# Patient Record
Sex: MALE | Race: WHITE | NOT HISPANIC OR LATINO | Employment: UNEMPLOYED | URBAN - METROPOLITAN AREA
[De-identification: names, ages, dates, MRNs, and addresses within clinical notes are randomized per-mention and may not be internally consistent; named-entity substitution may affect disease eponyms.]

---

## 2024-04-18 ENCOUNTER — HOSPITAL ENCOUNTER (INPATIENT)
Facility: HOSPITAL | Age: 64
LOS: 2 days | Discharge: HOME | DRG: 390 | End: 2024-04-20
Attending: EMERGENCY MEDICINE | Admitting: HOSPITALIST
Payer: COMMERCIAL

## 2024-04-18 DIAGNOSIS — E66.09 CLASS 1 OBESITY DUE TO EXCESS CALORIES WITH SERIOUS COMORBIDITY AND BODY MASS INDEX (BMI) OF 32.0 TO 32.9 IN ADULT: ICD-10-CM

## 2024-04-18 DIAGNOSIS — R07.9 CHEST PAIN: ICD-10-CM

## 2024-04-18 DIAGNOSIS — Z01.89 ENCOUNTER FOR IMAGING STUDY TO CONFIRM NASOGASTRIC (NG) TUBE PLACEMENT: ICD-10-CM

## 2024-04-18 DIAGNOSIS — I10 HYPERTENSION, UNSPECIFIED TYPE: ICD-10-CM

## 2024-04-18 DIAGNOSIS — K56.7 ILEUS: Primary | ICD-10-CM

## 2024-04-18 DIAGNOSIS — E83.39 HYPOPHOSPHATEMIA: ICD-10-CM

## 2024-04-18 PROBLEM — I25.10 CORONARY ARTERY DISEASE: Status: ACTIVE | Noted: 2024-04-18

## 2024-04-18 PROBLEM — K56.609 SBO (SMALL BOWEL OBSTRUCTION): Status: ACTIVE | Noted: 2024-04-18

## 2024-04-18 PROBLEM — E78.5 HYPERLIPEMIA: Status: ACTIVE | Noted: 2023-05-26

## 2024-04-18 LAB
ALBUMIN SERPL BCP-MCNC: 3.1 G/DL (ref 3.5–5.2)
ALLENS TEST: ABNORMAL
ALP SERPL-CCNC: 61 U/L (ref 55–135)
ALT SERPL W/O P-5'-P-CCNC: 32 U/L (ref 10–44)
ANION GAP SERPL CALC-SCNC: 16 MMOL/L (ref 8–16)
ANION GAP SERPL CALC-SCNC: 5 MMOL/L (ref 8–16)
AST SERPL-CCNC: 26 U/L (ref 10–40)
BASOPHILS # BLD AUTO: 0.01 K/UL (ref 0–0.2)
BASOPHILS NFR BLD: 0.2 % (ref 0–1.9)
BILIRUB SERPL-MCNC: 0.4 MG/DL (ref 0.1–1)
BUN SERPL-MCNC: 15 MG/DL (ref 6–30)
BUN SERPL-MCNC: 16 MG/DL (ref 8–23)
CALCIUM SERPL-MCNC: 8.7 MG/DL (ref 8.7–10.5)
CHLORIDE SERPL-SCNC: 103 MMOL/L (ref 95–110)
CHLORIDE SERPL-SCNC: 107 MMOL/L (ref 95–110)
CO2 SERPL-SCNC: 26 MMOL/L (ref 23–29)
CREAT SERPL-MCNC: 0.9 MG/DL (ref 0.5–1.4)
CREAT SERPL-MCNC: 0.9 MG/DL (ref 0.5–1.4)
DIFFERENTIAL METHOD BLD: ABNORMAL
EOSINOPHIL # BLD AUTO: 0 K/UL (ref 0–0.5)
EOSINOPHIL NFR BLD: 0.3 % (ref 0–8)
ERYTHROCYTE [DISTWIDTH] IN BLOOD BY AUTOMATED COUNT: 12.7 % (ref 11.5–14.5)
EST. GFR  (NO RACE VARIABLE): >60 ML/MIN/1.73 M^2
GLUCOSE SERPL-MCNC: 121 MG/DL (ref 70–110)
GLUCOSE SERPL-MCNC: 124 MG/DL (ref 70–110)
HCT VFR BLD AUTO: 39.1 % (ref 40–54)
HCT VFR BLD CALC: 39 %PCV (ref 36–54)
HGB BLD-MCNC: 13.2 G/DL (ref 14–18)
IMM GRANULOCYTES # BLD AUTO: 0.04 K/UL (ref 0–0.04)
IMM GRANULOCYTES NFR BLD AUTO: 0.6 % (ref 0–0.5)
LIPASE SERPL-CCNC: 23 U/L (ref 4–60)
LYMPHOCYTES # BLD AUTO: 0.5 K/UL (ref 1–4.8)
LYMPHOCYTES NFR BLD: 7.1 % (ref 18–48)
MCH RBC QN AUTO: 30.3 PG (ref 27–31)
MCHC RBC AUTO-ENTMCNC: 33.8 G/DL (ref 32–36)
MCV RBC AUTO: 90 FL (ref 82–98)
MONOCYTES # BLD AUTO: 0.6 K/UL (ref 0.3–1)
MONOCYTES NFR BLD: 8.6 % (ref 4–15)
NEUTROPHILS # BLD AUTO: 5.5 K/UL (ref 1.8–7.7)
NEUTROPHILS NFR BLD: 83.2 % (ref 38–73)
NRBC BLD-RTO: 0 /100 WBC
PLATELET # BLD AUTO: 196 K/UL (ref 150–450)
PMV BLD AUTO: 9.3 FL (ref 9.2–12.9)
POC IONIZED CALCIUM: 1.21 MMOL/L (ref 1.06–1.42)
POC TCO2 (MEASURED): 25 MMOL/L (ref 23–29)
POTASSIUM BLD-SCNC: 4 MMOL/L (ref 3.5–5.1)
POTASSIUM SERPL-SCNC: 4.1 MMOL/L (ref 3.5–5.1)
PROT SERPL-MCNC: 5.8 G/DL (ref 6–8.4)
RBC # BLD AUTO: 4.35 M/UL (ref 4.6–6.2)
SAMPLE: ABNORMAL
SITE: ABNORMAL
SODIUM BLD-SCNC: 139 MMOL/L (ref 136–145)
SODIUM SERPL-SCNC: 138 MMOL/L (ref 136–145)
WBC # BLD AUTO: 6.59 K/UL (ref 3.9–12.7)

## 2024-04-18 PROCEDURE — 83690 ASSAY OF LIPASE: CPT | Performed by: EMERGENCY MEDICINE

## 2024-04-18 PROCEDURE — 11000001 HC ACUTE MED/SURG PRIVATE ROOM

## 2024-04-18 PROCEDURE — 63600175 PHARM REV CODE 636 W HCPCS: Performed by: EMERGENCY MEDICINE

## 2024-04-18 PROCEDURE — 63600175 PHARM REV CODE 636 W HCPCS: Performed by: HOSPITALIST

## 2024-04-18 PROCEDURE — 99900035 HC TECH TIME PER 15 MIN (STAT)

## 2024-04-18 PROCEDURE — 99285 EMERGENCY DEPT VISIT HI MDM: CPT | Mod: 25

## 2024-04-18 PROCEDURE — 82330 ASSAY OF CALCIUM: CPT

## 2024-04-18 PROCEDURE — 84295 ASSAY OF SERUM SODIUM: CPT

## 2024-04-18 PROCEDURE — 96374 THER/PROPH/DIAG INJ IV PUSH: CPT

## 2024-04-18 PROCEDURE — 82565 ASSAY OF CREATININE: CPT

## 2024-04-18 PROCEDURE — 99222 1ST HOSP IP/OBS MODERATE 55: CPT | Mod: ,,, | Performed by: SURGERY

## 2024-04-18 PROCEDURE — 85014 HEMATOCRIT: CPT

## 2024-04-18 PROCEDURE — 96375 TX/PRO/DX INJ NEW DRUG ADDON: CPT

## 2024-04-18 PROCEDURE — 85025 COMPLETE CBC W/AUTO DIFF WBC: CPT | Performed by: EMERGENCY MEDICINE

## 2024-04-18 PROCEDURE — 80053 COMPREHEN METABOLIC PANEL: CPT | Performed by: EMERGENCY MEDICINE

## 2024-04-18 PROCEDURE — 84132 ASSAY OF SERUM POTASSIUM: CPT

## 2024-04-18 PROCEDURE — 0D9670Z DRAINAGE OF STOMACH WITH DRAINAGE DEVICE, VIA NATURAL OR ARTIFICIAL OPENING: ICD-10-PCS | Performed by: EMERGENCY MEDICINE

## 2024-04-18 RX ORDER — IBUPROFEN 200 MG
16 TABLET ORAL
Status: DISCONTINUED | OUTPATIENT
Start: 2024-04-18 | End: 2024-04-20 | Stop reason: HOSPADM

## 2024-04-18 RX ORDER — TALC
6 POWDER (GRAM) TOPICAL NIGHTLY PRN
Status: DISCONTINUED | OUTPATIENT
Start: 2024-04-18 | End: 2024-04-18

## 2024-04-18 RX ORDER — MORPHINE SULFATE 4 MG/ML
6 INJECTION, SOLUTION INTRAMUSCULAR; INTRAVENOUS
Status: COMPLETED | OUTPATIENT
Start: 2024-04-18 | End: 2024-04-18

## 2024-04-18 RX ORDER — CLOPIDOGREL BISULFATE 75 MG/1
75 TABLET ORAL DAILY
COMMUNITY
Start: 2024-01-05

## 2024-04-18 RX ORDER — NALOXONE HCL 0.4 MG/ML
0.02 VIAL (ML) INJECTION
Status: DISCONTINUED | OUTPATIENT
Start: 2024-04-18 | End: 2024-04-20 | Stop reason: HOSPADM

## 2024-04-18 RX ORDER — AMLODIPINE BESYLATE 2.5 MG/1
2.5 TABLET ORAL DAILY
COMMUNITY
Start: 2024-04-15

## 2024-04-18 RX ORDER — GLUCAGON 1 MG
1 KIT INJECTION
Status: DISCONTINUED | OUTPATIENT
Start: 2024-04-18 | End: 2024-04-20 | Stop reason: HOSPADM

## 2024-04-18 RX ORDER — IBUPROFEN 200 MG
24 TABLET ORAL
Status: DISCONTINUED | OUTPATIENT
Start: 2024-04-18 | End: 2024-04-20 | Stop reason: HOSPADM

## 2024-04-18 RX ORDER — ONDANSETRON HYDROCHLORIDE 2 MG/ML
4 INJECTION, SOLUTION INTRAVENOUS
Status: COMPLETED | OUTPATIENT
Start: 2024-04-18 | End: 2024-04-18

## 2024-04-18 RX ORDER — ONDANSETRON 4 MG/1
4 TABLET, ORALLY DISINTEGRATING ORAL EVERY 8 HOURS PRN
Status: DISCONTINUED | OUTPATIENT
Start: 2024-04-18 | End: 2024-04-20 | Stop reason: HOSPADM

## 2024-04-18 RX ORDER — SODIUM CHLORIDE 0.9 % (FLUSH) 0.9 %
10 SYRINGE (ML) INJECTION EVERY 8 HOURS
Status: DISCONTINUED | OUTPATIENT
Start: 2024-04-18 | End: 2024-04-18

## 2024-04-18 RX ORDER — DEXTROSE MONOHYDRATE AND SODIUM CHLORIDE 5; .9 G/100ML; G/100ML
INJECTION, SOLUTION INTRAVENOUS CONTINUOUS
Status: DISCONTINUED | OUTPATIENT
Start: 2024-04-18 | End: 2024-04-20 | Stop reason: HOSPADM

## 2024-04-18 RX ORDER — SODIUM CHLORIDE 0.9 % (FLUSH) 0.9 %
10 SYRINGE (ML) INJECTION
Status: DISCONTINUED | OUTPATIENT
Start: 2024-04-18 | End: 2024-04-20 | Stop reason: HOSPADM

## 2024-04-18 RX ADMIN — DEXTROSE AND SODIUM CHLORIDE: 5; 900 INJECTION, SOLUTION INTRAVENOUS at 07:04

## 2024-04-18 RX ADMIN — ONDANSETRON 4 MG: 2 INJECTION INTRAMUSCULAR; INTRAVENOUS at 03:04

## 2024-04-18 RX ADMIN — MORPHINE SULFATE 6 MG: 4 INJECTION, SOLUTION INTRAMUSCULAR; INTRAVENOUS at 03:04

## 2024-04-18 NOTE — ED PROVIDER NOTES
Emergency Department Provider Note    David Krishnan   63 y.o. male   [id number]      4/18/2024       History     This history was obtained from the patient without limitations.  I also reviewed the provider documentation and CT imaging report sent with the patient.    He is a 63-year-old with the below past medical history who presents by ambulance from [facility] where he presented with abdominal and was found to have partial small-bowel obstruction on CT abdomen and pelvis without contrast.  He complains intermittent pain across his upper abdomen that began yesterday evening.  He is associated nausea but no vomiting.  He has a history of Nissen fundoplication.  The abdominal CT also identified a moderate-sized hiatal hernia.  He denies fever, chills, headache, dizziness, chest pain, and shortness.  He has had a bowel movement since the onset of his symptoms and it was loose.  He is still having flatus.         Past Medical History:   Diagnosis Date    Coronary artery disease 04/18/2024    Hyperlipemia 05/26/2023      No past surgical history on file.   No family history on file.   Social History     Socioeconomic History    Marital status:       Review of patient's allergies indicates:  No Known Allergies        Physical Examination     Initial Vitals [04/18/24 1327]   BP Pulse Resp Temp SpO2   (!) 141/82 74 18 98.7 °F (37.1 °C) 96 %      MAP       --           Physical Exam    Nursing note and vitals reviewed.  Constitutional: He is not diaphoretic. No distress.   Eyes: Conjunctivae are normal. No scleral icterus.   Cardiovascular:  Normal rate, regular rhythm and normal heart sounds.     Exam reveals no gallop and no friction rub.       No murmur heard.  Pulmonary/Chest: No respiratory distress. He has no wheezes. He has no rhonchi.   Abdominal: Abdomen is soft. He exhibits no distension. There is abdominal tenderness in the right upper quadrant, epigastric area and left upper quadrant.  There is guarding.     Neurological: He is alert and oriented to person, place, and time. GCS score is 15. GCS eye subscore is 4. GCS verbal subscore is 5. GCS motor subscore is 6.   Skin: Skin is warm and dry. No pallor.            Labs     Labs Reviewed   CBC W/ AUTO DIFFERENTIAL - Abnormal; Notable for the following components:       Result Value    RBC 4.35 (*)     Hemoglobin 13.2 (*)     Hematocrit 39.1 (*)     Immature Granulocytes 0.6 (*)     Lymph # 0.5 (*)     Gran % 83.2 (*)     Lymph % 7.1 (*)     All other components within normal limits   COMPREHENSIVE METABOLIC PANEL - Abnormal; Notable for the following components:    Glucose 124 (*)     Total Protein 5.8 (*)     Albumin 3.1 (*)     Anion Gap 5 (*)     All other components within normal limits   ISTAT PROCEDURE - Abnormal; Notable for the following components:    POC Glucose 121 (*)     All other components within normal limits   LIPASE   ISTAT CHEM8        Imaging     Imaging Results              XR Gastric tube check, non-radiologist performed (Final result)  Result time 04/18/24 17:58:02   Procedure changed from X-Ray Abdomen AP 1 View (KUB)     Final result by Pascale Mcnair MD (04/18/24 17:58:02)                   Impression:      NG tube in place.      Electronically signed by: Pascale Mcnair MD  Date:    04/18/2024  Time:    17:58               Narrative:    EXAMINATION:  XR GASTRIC TUBE CHECK, NON-RADIOLOGIST PERFORMED    CLINICAL HISTORY:  NG Placement;  Encounter for other specified special examinations    TECHNIQUE:  Single AP view of the upper abdomen.    COMPARISON:  None    FINDINGS:  Enteric tube extends well below the diaphragm curled retrograde in the gastric fundal region.  There is gaseous distention of small bowel loops in the mid upper abdomen which could indicate ileus or developing obstruction.                                        ED Course     The patient received the following medications:  Medications   dextrose 5 % and  0.9 % NaCl infusion ( Intravenous New Bag 4/19/24 1413)   naloxone 0.4 mg/mL injection 0.02 mg (has no administration in time range)   glucose chewable tablet 16 g (has no administration in time range)   glucose chewable tablet 24 g (has no administration in time range)   glucagon (human recombinant) injection 1 mg (has no administration in time range)   sodium chloride 0.9% flush 10 mL (has no administration in time range)   ondansetron disintegrating tablet 4 mg (has no administration in time range)   acetaminophen tablet 650 mg (has no administration in time range)   morphine injection 6 mg (6 mg Intravenous Given 4/18/24 1533)   ondansetron injection 4 mg (4 mg Intravenous Given 4/18/24 1533)   sodium phosphate 15 mmol in dextrose 5 % (D5W) 250 mL IVPB (0 mmol Intravenous Stopped 4/19/24 1210)   ketorolac injection 15 mg (15 mg Intravenous Given 4/19/24 0659)   ketorolac injection 15 mg (15 mg Intravenous Given 4/19/24 1517)   diatrizoate meglumineand-diatrizoate sodium (GASTROVIEW) solution 120 mL (120 mLs Per NG tube Given 4/19/24 1000)       Procedures    ED Course as of 04/19/24 2134   Thu Apr 18, 2024   1542 WBC: 6.59 [LP]   1542 Hemoglobin(!): 13.2 [LP]   1542 Hematocrit(!): 39.1 [LP]   1542 Platelet Count: 196 [LP]   1542 Comp. Metabolic Panel(!)  Glucose 124, total protein 5.8, albumin 3.1.  All other results WNL. [LP]   1542 Lipase: 23 [LP]      ED Course User Index  [LP] Michael Miles III, MD        Medical Decision Making                 Medical Decision Making  Patient sent to the ED for further evaluation after partial SBO identified on CT.  Multifocal abdominal tenderness on exam.  General surgery consulted and felt SBO to be unlikely since the patient was having bowel movements and flatus.  They recommended NG tube placement for gastrointestinal decompression and admission to hospital medicine.     Problems Addressed:  Ileus: acute illness or injury    Amount and/or Complexity of Data  Reviewed  Labs: ordered. Decision-making details documented in ED Course.  Radiology: ordered.    Risk  OTC drugs.  Prescription drug management.  Decision regarding hospitalization.              Diagnoses       ICD-10-CM ICD-9-CM   1. Ileus  K56.7 560.1   2. Encounter for imaging study to confirm nasogastric (NG) tube placement  Z01.89 V72.5         Dispostion      ED Disposition Condition    Admit Stable             Michael Miles III, MD  04/19/24 5531

## 2024-04-18 NOTE — ASSESSMENT & PLAN NOTE
Followed in [place] for history of CAD. Home plavix held while NPO with NG tube in place given ileus.

## 2024-04-18 NOTE — H&P
[facility] Emergency Dept  [facility]  History & Physical    Patient Name: David Krishnan  MRN: [id number]  Patient Class: IP- Inpatient  Admission Date: 4/18/2024  Attending Physician: Coy Osborn, Sy   Primary Care Provider: Sandy, Primary Doctor         Patient information was obtained from patient, past medical records, and ER records.     Subjective:     Principal Problem:Ileus    Chief Complaint:   Chief Complaint   Patient presents with    Abdominal Pain     EMS called to 64yo male that was at [facility] for abdominal pain, diarrhea, and nausea that started last night. CT at [facility] confirmed a small bowel obstruction and was sent here for further evaluation. He was given GI cocktail, zofran, and LR at [facility].         HPI: David Krishnan 63 y.o. male with CAD, HTN, history of Nissen fundoplication presents to the hosptial with a chief complaint of abdominal pain.  He reports 24 hours of progressive abdominal pain with associated nausea.  Reports he has had bowel movements since the symptoms began in his bowel movement was this morning.  He initially was seen and at [facility] and underwent a CT scan of the was directed to the emergency room given abnormal findings.  He finds his nausea is improved with nasogastric tube placement.  Denies any passage of flatus today.  He denies fever chest pain shortness breast leg swelling hematuria hematemesis dizziness or syncope.  His Nissen fundoplication was 15 years ago.    In the ED, afebrile without leukocytosis seen by General surgery who reviewed outside CT scan suspect his symptoms related to ileus.    Past Medical History:   Diagnosis Date    Coronary artery disease 04/18/2024    Hyperlipemia 05/26/2023       No past surgical history on file.    Review of patient's allergies indicates:  No Known Allergies    Current Facility-Administered Medications   Medication Dose Route Frequency Provider Last Rate Last Admin    dextrose 5 % and 0.9 % NaCl  infusion   Intravenous Continuous Coy Osborn MD        glucagon (human recombinant) injection 1 mg  1 mg Intramuscular PRN Sai Mcelroy PA-C        glucose chewable tablet 16 g  16 g Oral PRN Sai Mcelroy PA-C        glucose chewable tablet 24 g  24 g Oral PRN Sai Mcelroy PA-C        naloxone 0.4 mg/mL injection 0.02 mg  0.02 mg Intravenous PRN ShowSai patel PA-C        ondansetron disintegrating tablet 4 mg  4 mg Oral Q8H PRN ShowSai patel PA-C        sodium chloride 0.9% flush 10 mL  10 mL Intravenous PRN ShowSai patel PA-C         Current Outpatient Medications   Medication Sig Dispense Refill    amLODIPine (NORVASC) 2.5 MG tablet Take 2.5 mg by mouth once daily.      clopidogreL (PLAVIX) 75 mg tablet Take 75 mg by mouth once daily.       Family History    None       Tobacco Use    Smoking status: Not on file    Smokeless tobacco: Not on file   Substance and Sexual Activity    Alcohol use: Not on file    Drug use: Not on file    Sexual activity: Not on file     Review of Systems   Constitutional:  Negative for chills and fever.   HENT:  Negative for nosebleeds and tinnitus.    Eyes:  Negative for photophobia and visual disturbance.   Respiratory:  Negative for shortness of breath and wheezing.    Cardiovascular:  Negative for chest pain, palpitations and leg swelling.   Gastrointestinal:  Positive for abdominal pain and nausea. Negative for abdominal distention and vomiting.   Genitourinary:  Negative for dysuria, flank pain and hematuria.   Musculoskeletal:  Negative for gait problem and joint swelling.   Skin:  Negative for rash and wound.   Neurological:  Negative for seizures and syncope.     Objective:     Vital Signs (Most Recent):  Temp: 98.7 °F (37.1 °C) (04/18/24 1327)  Pulse: 72 (04/18/24 1733)  Resp: (!) 22 (04/18/24 1733)  BP: (!) 142/79 (04/18/24 1733)  SpO2: 96 % (04/18/24 1733) Vital Signs (24h Range):  Temp:  [98.7 °F (37.1 °C)] 98.7 °F (37.1 °C)  Pulse:  [72-76]  72  Resp:  [18-22] 22  SpO2:  [91 %-96 %] 96 %  BP: (131-142)/(73-82) 142/79     Weight: 117.9 kg (260 lb)  Body mass index is 32.5 kg/m².     Physical Exam  Vitals and nursing note reviewed.   Constitutional:       General: He is not in acute distress.     Appearance: He is well-developed. He is not diaphoretic.   HENT:      Head: Normocephalic and atraumatic.      Right Ear: External ear normal.      Left Ear: External ear normal.      Nose:      Comments: NG tube in place  Eyes:      General:         Right eye: No discharge.         Left eye: No discharge.      Conjunctiva/sclera: Conjunctivae normal.   Neck:      Thyroid: No thyromegaly.   Cardiovascular:      Rate and Rhythm: Normal rate and regular rhythm.      Heart sounds: No murmur heard.  Pulmonary:      Effort: Pulmonary effort is normal. No respiratory distress.      Breath sounds: Normal breath sounds.   Abdominal:      General: Bowel sounds are normal. There is distension.      Palpations: Abdomen is soft. There is no mass.      Tenderness: There is abdominal tenderness (diffuse tenderness without rebound). There is no rebound.   Musculoskeletal:         General: No deformity.      Cervical back: Normal range of motion and neck supple.      Right lower leg: No edema.      Left lower leg: No edema.   Skin:     General: Skin is warm and dry.   Neurological:      Mental Status: He is alert and oriented to person, place, and time.      Sensory: No sensory deficit.   Psychiatric:         Mood and Affect: Mood normal.         Behavior: Behavior normal.                Significant Labs: CBC:   Recent Labs   Lab 04/18/24  1442 04/18/24  1443   WBC  --  6.59   HGB  --  13.2*   HCT 39 39.1*   PLT  --  196     CMP:   Recent Labs   Lab 04/18/24  1443      K 4.1      CO2 26   *   BUN 16   CREATININE 0.9   CALCIUM 8.7   PROT 5.8*   ALBUMIN 3.1*   BILITOT 0.4   ALKPHOS 61   AST 26   ALT 32   ANIONGAP 5*       Significant Imaging:   Imaging Results               X-Ray Abdomen AP 1 View (KUB) (In process)                     Assessment/Plan:     * Ileus  Presented with abdominal pain and nausea. CT scan from [facility] with findings concerning for possible SBO. He repotrs has had loose BM today.   CT scan reviewed by surgery and symptoms suspected related to ileus  NG tube to be placed, LIWS after confirmation  NPO/IVF  Daily CMP/Mg/Phos    Will treat conservatively with bowel rest, IV fluids, serial abdominal exams and avoidance of GI paralytics such as narcotics or anti-spasmodics. Monitor patient closely.       HTN (hypertension)  Chronic, controlled. Latest blood pressure and vitals reviewed-     Temp:  [98.7 °F (37.1 °C)]   Pulse:  [72-76]   Resp:  [18-22]   BP: (131-142)/(73-82)   SpO2:  [91 %-96 %] .   Home meds for hypertension were reviewed and noted below.   Hypertension Medications               amLODIPine (NORVASC) 2.5 MG tablet Take 2.5 mg by mouth once daily.            While in the hospital, will manage blood pressure as follows; Adjust home antihypertensive regimen as follows- home amlodipine held while NPO for ileus as above    Will utilize p.r.n. blood pressure medication only if patient's blood pressure greater than 180/110 and he develops symptoms such as worsening chest pain or shortness of breath.    Coronary artery disease  Followed in [place] for history of CAD. Home plavix held while NPO with NG tube in place given ileus.       VTE Risk Mitigation (From admission, onward)           Ordered     IP VTE HIGH RISK PATIENT  Once         04/18/24 1739     Place sequential compression device  Until discontinued         04/18/24 1739     Place BRITTNEY hose  Until discontinued         04/18/24 1739                     Discussed with ED physician.    VTE: brittney/scd  Code: Full  Diet: NPO  Dispo: pending tolerating PO and surgery recommendations  As clarification, on 4/18/2024, patient should be admitted to inpatient services under my care in collaboration with  Coy Osborn MD. Sai Mcelroy PA-C        AdmissionCare    Guideline: Intestinal Obstruction, Inpatient    Based on the indications selected for the patient, the bed status of Inpatient was determined to be MET    The following indications were selected as present at the time of evaluation of the patient:      - Signs and symptoms of bowel obstruction (eg, vomiting, inability to tolerate PO intake, pain, distention) that are severe (feculent vomiting, Hypotension, electrolyte abnormality, evidence of bowel ischemia or suspected perforation), or persistent (eg, NG tube placed and will need to be continued, IV hydration support required)   - Imaging study consistent with bowel obstruction (ie, alternative diagnosis not more likely)    AdmissionCare documentation entered by: Lizbeth Coombs    [facility], 27th edition, Copyright © 2023 [facility], [employer] All Rights Reserved.  [id number]:18:11-05:00    Sai Mcelroy PA-C  Department of Hospital Medicine  [facility] - Emergency Dept

## 2024-04-18 NOTE — HPI
aDvid Krishnan 63 y.o. male with CAD, HTN, history of Nissen fundoplication presents to the hosptial with a chief complaint of abdominal pain.  He reports 24 hours of progressive abdominal pain with associated nausea.  Reports he has had bowel movements since the symptoms began in his bowel movement was this morning.  He initially was seen and at [facility] and underwent a CT scan of the was directed to the emergency room given abnormal findings.  He finds his nausea is improved with nasogastric tube placement.  Denies any passage of flatus today.  He denies fever chest pain shortness breast leg swelling hematuria hematemesis dizziness or syncope.  His Nissen fundoplication was 15 years ago.    In the ED, afebrile without leukocytosis seen by General surgery who reviewed outside CT scan suspect his symptoms related to ileus.

## 2024-04-18 NOTE — ADMISSIONCARE
AdmissionCare    Guideline: Intestinal Obstruction, Inpatient    Based on the indications selected for the patient, the bed status of Inpatient was determined to be MET    The following indications were selected as present at the time of evaluation of the patient:      - Signs and symptoms of bowel obstruction (eg, vomiting, inability to tolerate PO intake, pain, distention) that are severe (feculent vomiting, Hypotension, electrolyte abnormality, evidence of bowel ischemia or suspected perforation), or persistent (eg, NG tube placed and will need to be continued, IV hydration support required)   - Imaging study consistent with bowel obstruction (ie, alternative diagnosis not more likely)    AdmissionCare documentation entered by: Lizbeth Coombs    [facility], 27th edition, Copyright © 2023 [facility] [place] [employer], [facility] All Rights Reserved.  [id number]:18:11-05:00 Continue dialysis MWF

## 2024-04-18 NOTE — SUBJECTIVE & OBJECTIVE
Past Medical History:   Diagnosis Date    Coronary artery disease 04/18/2024    Hyperlipemia 05/26/2023       No past surgical history on file.    Review of patient's allergies indicates:  No Known Allergies    Current Facility-Administered Medications   Medication Dose Route Frequency Provider Last Rate Last Admin    dextrose 5 % and 0.9 % NaCl infusion   Intravenous Continuous Coy Osborn MD        glucagon (human recombinant) injection 1 mg  1 mg Intramuscular PRN ShowSai patel PA-C        glucose chewable tablet 16 g  16 g Oral PRN ShowSai patel PA-C        glucose chewable tablet 24 g  24 g Oral PRN ShowSai patel PA-C        naloxone 0.4 mg/mL injection 0.02 mg  0.02 mg Intravenous PRN ShowSai patel PA-C        ondansetron disintegrating tablet 4 mg  4 mg Oral Q8H PRN ShowSai patel PA-C        sodium chloride 0.9% flush 10 mL  10 mL Intravenous PRN ShowSai patel PA-C         Current Outpatient Medications   Medication Sig Dispense Refill    amLODIPine (NORVASC) 2.5 MG tablet Take 2.5 mg by mouth once daily.      clopidogreL (PLAVIX) 75 mg tablet Take 75 mg by mouth once daily.       Family History    None       Tobacco Use    Smoking status: Not on file    Smokeless tobacco: Not on file   Substance and Sexual Activity    Alcohol use: Not on file    Drug use: Not on file    Sexual activity: Not on file     Review of Systems   Constitutional:  Negative for chills and fever.   HENT:  Negative for nosebleeds and tinnitus.    Eyes:  Negative for photophobia and visual disturbance.   Respiratory:  Negative for shortness of breath and wheezing.    Cardiovascular:  Negative for chest pain, palpitations and leg swelling.   Gastrointestinal:  Positive for abdominal pain and nausea. Negative for abdominal distention and vomiting.   Genitourinary:  Negative for dysuria, flank pain and hematuria.   Musculoskeletal:  Negative for gait problem and joint swelling.   Skin:  Negative for rash and  wound.   Neurological:  Negative for seizures and syncope.     Objective:     Vital Signs (Most Recent):  Temp: 98.7 °F (37.1 °C) (04/18/24 1327)  Pulse: 72 (04/18/24 1733)  Resp: (!) 22 (04/18/24 1733)  BP: (!) 142/79 (04/18/24 1733)  SpO2: 96 % (04/18/24 1733) Vital Signs (24h Range):  Temp:  [98.7 °F (37.1 °C)] 98.7 °F (37.1 °C)  Pulse:  [72-76] 72  Resp:  [18-22] 22  SpO2:  [91 %-96 %] 96 %  BP: (131-142)/(73-82) 142/79     Weight: 117.9 kg (260 lb)  Body mass index is 32.5 kg/m².     Physical Exam  Vitals and nursing note reviewed.   Constitutional:       General: He is not in acute distress.     Appearance: He is well-developed. He is not diaphoretic.   HENT:      Head: Normocephalic and atraumatic.      Right Ear: External ear normal.      Left Ear: External ear normal.      Nose:      Comments: NG tube in place  Eyes:      General:         Right eye: No discharge.         Left eye: No discharge.      Conjunctiva/sclera: Conjunctivae normal.   Neck:      Thyroid: No thyromegaly.   Cardiovascular:      Rate and Rhythm: Normal rate and regular rhythm.      Heart sounds: No murmur heard.  Pulmonary:      Effort: Pulmonary effort is normal. No respiratory distress.      Breath sounds: Normal breath sounds.   Abdominal:      General: Bowel sounds are normal. There is distension.      Palpations: Abdomen is soft. There is no mass.      Tenderness: There is abdominal tenderness (diffuse tenderness without rebound). There is no rebound.   Musculoskeletal:         General: No deformity.      Cervical back: Normal range of motion and neck supple.      Right lower leg: No edema.      Left lower leg: No edema.   Skin:     General: Skin is warm and dry.   Neurological:      Mental Status: He is alert and oriented to person, place, and time.      Sensory: No sensory deficit.   Psychiatric:         Mood and Affect: Mood normal.         Behavior: Behavior normal.                Significant Labs: CBC:   Recent Labs   Lab  04/18/24  1442 04/18/24  1443   WBC  --  6.59   HGB  --  13.2*   HCT 39 39.1*   PLT  --  196     CMP:   Recent Labs   Lab 04/18/24  1443      K 4.1      CO2 26   *   BUN 16   CREATININE 0.9   CALCIUM 8.7   PROT 5.8*   ALBUMIN 3.1*   BILITOT 0.4   ALKPHOS 61   AST 26   ALT 32   ANIONGAP 5*       Significant Imaging:   Imaging Results              X-Ray Abdomen AP 1 View (KUB) (In process)

## 2024-04-18 NOTE — ASSESSMENT & PLAN NOTE
Presented with abdominal pain and nausea. CT scan from [facility] with findings concerning for possible SBO. He repotrs has had loose BM today.   CT scan reviewed by surgery and symptoms suspected related to ileus  NG tube to be placed, EUOLGIO after confirmation  NPO/IVF  Daily CMP/Mg/Phos    Will treat conservatively with bowel rest, IV fluids, serial abdominal exams and avoidance of GI paralytics such as narcotics or anti-spasmodics. Monitor patient closely.

## 2024-04-18 NOTE — ASSESSMENT & PLAN NOTE
Chronic, controlled. Latest blood pressure and vitals reviewed-     Temp:  [98.7 °F (37.1 °C)]   Pulse:  [72-76]   Resp:  [18-22]   BP: (131-142)/(73-82)   SpO2:  [91 %-96 %] .   Home meds for hypertension were reviewed and noted below.   Hypertension Medications               amLODIPine (NORVASC) 2.5 MG tablet Take 2.5 mg by mouth once daily.            While in the hospital, will manage blood pressure as follows; Adjust home antihypertensive regimen as follows- home amlodipine held while NPO for ileus as above    Will utilize p.r.n. blood pressure medication only if patient's blood pressure greater than 180/110 and he develops symptoms such as worsening chest pain or shortness of breath.

## 2024-04-18 NOTE — CONSULTS
General Surgery    Reason for Consult: Concern for SBO    History of Present Illness:  63 y.o. male with CAD and prior Nissen Fundoplication presenting with 24h of progressive abdominal pain and nausea. He reports that the pain started after eating. He initially thought the pain was secondary to food poisoning. Since the onset of the pain, he had two loose Bms last night which were small and a large, loose BM this morning. He denies having pain like this before.    He is visiting from the [place] area.    Allergies: NKDA  PMHx: Above  PSHx: Nissen Fundoplication     ROS per HPI    Vital Signs (Most Recent)  Temp: 98.7 °F (37.1 °C) (04/18/24 1327)  Pulse: 76 (04/18/24 1613)  Resp: 18 (04/18/24 1613)  BP: 131/73 (04/18/24 1613)  SpO2: (!) 91 % (04/18/24 1613)    Physical Exam  Vitals reviewed.   Constitutional:       Appearance: Normal appearance. He is obese. He is not toxic-appearing.   HENT:      Head: Normocephalic.   Eyes:      Extraocular Movements: Extraocular movements intact.      Conjunctiva/sclera: Conjunctivae normal.   Cardiovascular:      Rate and Rhythm: Normal rate and regular rhythm.   Pulmonary:      Effort: Pulmonary effort is normal. No respiratory distress.   Abdominal:      Comments: Mildly distended but soft. Nontender, but just received narcotic pain meds   Musculoskeletal:         General: No signs of injury.      Cervical back: Normal range of motion and neck supple.      Right lower leg: No edema.      Left lower leg: No edema.   Skin:     General: Skin is warm and dry.   Neurological:      General: No focal deficit present.      Mental Status: He is alert and oriented to person, place, and time.           Labs- Reviewed    Imaging-   -Outside CT images personally reviewed. The stomach is moderately dilated. The small bowel is diffusely dilated without a convincing transition point.    Assessment and Plan:  63 y.o. male with CAD and prior Nissen Fundoplication presenting with 24h of  abdominal pain and nausea. Workup seems most consistent with an ileus. A mechanical obstruction is less likely given his continued bowel function throughout this time.    -Recommend NG placement for symptomatic relief  -IVF  -NPO  -Serial exams    Chace Garcia MD  General Surgery PGY-4  04/18/2024

## 2024-04-19 PROBLEM — E83.39 HYPOPHOSPHATEMIA: Status: ACTIVE | Noted: 2024-04-19

## 2024-04-19 PROBLEM — E66.09 CLASS 1 OBESITY DUE TO EXCESS CALORIES WITH SERIOUS COMORBIDITY AND BODY MASS INDEX (BMI) OF 32.0 TO 32.9 IN ADULT: Status: ACTIVE | Noted: 2024-04-19

## 2024-04-19 LAB
ALBUMIN SERPL BCP-MCNC: 2.7 G/DL (ref 3.5–5.2)
ALP SERPL-CCNC: 51 U/L (ref 55–135)
ALT SERPL W/O P-5'-P-CCNC: 31 U/L (ref 10–44)
ANION GAP SERPL CALC-SCNC: 7 MMOL/L (ref 8–16)
AST SERPL-CCNC: 25 U/L (ref 10–40)
BASOPHILS # BLD AUTO: 0.01 K/UL (ref 0–0.2)
BASOPHILS NFR BLD: 0.3 % (ref 0–1.9)
BILIRUB SERPL-MCNC: 0.4 MG/DL (ref 0.1–1)
BILIRUB UR QL STRIP: NEGATIVE
BUN SERPL-MCNC: 17 MG/DL (ref 8–23)
CALCIUM SERPL-MCNC: 8 MG/DL (ref 8.7–10.5)
CHLORIDE SERPL-SCNC: 108 MMOL/L (ref 95–110)
CLARITY UR: CLEAR
CO2 SERPL-SCNC: 24 MMOL/L (ref 23–29)
COLOR UR: YELLOW
CREAT SERPL-MCNC: 0.9 MG/DL (ref 0.5–1.4)
DIFFERENTIAL METHOD BLD: ABNORMAL
EOSINOPHIL # BLD AUTO: 0.1 K/UL (ref 0–0.5)
EOSINOPHIL NFR BLD: 2.4 % (ref 0–8)
ERYTHROCYTE [DISTWIDTH] IN BLOOD BY AUTOMATED COUNT: 12.9 % (ref 11.5–14.5)
EST. GFR  (NO RACE VARIABLE): >60 ML/MIN/1.73 M^2
GLUCOSE SERPL-MCNC: 117 MG/DL (ref 70–110)
GLUCOSE UR QL STRIP: NEGATIVE
HCT VFR BLD AUTO: 38.1 % (ref 40–54)
HGB BLD-MCNC: 12.1 G/DL (ref 14–18)
HGB UR QL STRIP: NEGATIVE
IMM GRANULOCYTES # BLD AUTO: 0.01 K/UL (ref 0–0.04)
IMM GRANULOCYTES NFR BLD AUTO: 0.3 % (ref 0–0.5)
KETONES UR QL STRIP: ABNORMAL
LEUKOCYTE ESTERASE UR QL STRIP: NEGATIVE
LYMPHOCYTES # BLD AUTO: 0.9 K/UL (ref 1–4.8)
LYMPHOCYTES NFR BLD: 22.4 % (ref 18–48)
MAGNESIUM SERPL-MCNC: 2 MG/DL (ref 1.6–2.6)
MCH RBC QN AUTO: 29.6 PG (ref 27–31)
MCHC RBC AUTO-ENTMCNC: 31.8 G/DL (ref 32–36)
MCV RBC AUTO: 93 FL (ref 82–98)
MONOCYTES # BLD AUTO: 0.6 K/UL (ref 0.3–1)
MONOCYTES NFR BLD: 16.4 % (ref 4–15)
NEUTROPHILS # BLD AUTO: 2.2 K/UL (ref 1.8–7.7)
NEUTROPHILS NFR BLD: 58.2 % (ref 38–73)
NITRITE UR QL STRIP: NEGATIVE
NRBC BLD-RTO: 0 /100 WBC
PH UR STRIP: 6 [PH] (ref 5–8)
PHOSPHATE SERPL-MCNC: 2.5 MG/DL (ref 2.7–4.5)
PLATELET # BLD AUTO: 217 K/UL (ref 150–450)
PMV BLD AUTO: 9.6 FL (ref 9.2–12.9)
POTASSIUM SERPL-SCNC: 3.7 MMOL/L (ref 3.5–5.1)
PROT SERPL-MCNC: 5.3 G/DL (ref 6–8.4)
PROT UR QL STRIP: ABNORMAL
RBC # BLD AUTO: 4.09 M/UL (ref 4.6–6.2)
SODIUM SERPL-SCNC: 139 MMOL/L (ref 136–145)
SP GR UR STRIP: 1.03 (ref 1–1.03)
URN SPEC COLLECT METH UR: ABNORMAL
UROBILINOGEN UR STRIP-ACNC: NEGATIVE EU/DL
WBC # BLD AUTO: 3.79 K/UL (ref 3.9–12.7)

## 2024-04-19 PROCEDURE — 36415 COLL VENOUS BLD VENIPUNCTURE: CPT | Performed by: PHYSICIAN ASSISTANT

## 2024-04-19 PROCEDURE — 84100 ASSAY OF PHOSPHORUS: CPT | Performed by: PHYSICIAN ASSISTANT

## 2024-04-19 PROCEDURE — 63600175 PHARM REV CODE 636 W HCPCS: Performed by: STUDENT IN AN ORGANIZED HEALTH CARE EDUCATION/TRAINING PROGRAM

## 2024-04-19 PROCEDURE — 85025 COMPLETE CBC W/AUTO DIFF WBC: CPT | Performed by: PHYSICIAN ASSISTANT

## 2024-04-19 PROCEDURE — 25000003 PHARM REV CODE 250: Performed by: STUDENT IN AN ORGANIZED HEALTH CARE EDUCATION/TRAINING PROGRAM

## 2024-04-19 PROCEDURE — 63600175 PHARM REV CODE 636 W HCPCS: Performed by: HOSPITALIST

## 2024-04-19 PROCEDURE — 81003 URINALYSIS AUTO W/O SCOPE: CPT | Performed by: EMERGENCY MEDICINE

## 2024-04-19 PROCEDURE — 25500020 PHARM REV CODE 255: Performed by: STUDENT IN AN ORGANIZED HEALTH CARE EDUCATION/TRAINING PROGRAM

## 2024-04-19 PROCEDURE — 80053 COMPREHEN METABOLIC PANEL: CPT | Performed by: PHYSICIAN ASSISTANT

## 2024-04-19 PROCEDURE — 83735 ASSAY OF MAGNESIUM: CPT | Performed by: PHYSICIAN ASSISTANT

## 2024-04-19 PROCEDURE — 11000001 HC ACUTE MED/SURG PRIVATE ROOM

## 2024-04-19 RX ORDER — KETOROLAC TROMETHAMINE 30 MG/ML
15 INJECTION, SOLUTION INTRAMUSCULAR; INTRAVENOUS ONCE
Status: COMPLETED | OUTPATIENT
Start: 2024-04-19 | End: 2024-04-19

## 2024-04-19 RX ORDER — ACETAMINOPHEN 325 MG/1
650 TABLET ORAL EVERY 6 HOURS PRN
Status: DISCONTINUED | OUTPATIENT
Start: 2024-04-19 | End: 2024-04-20 | Stop reason: HOSPADM

## 2024-04-19 RX ADMIN — SODIUM PHOSPHATE, MONOBASIC, MONOHYDRATE AND SODIUM PHOSPHATE, DIBASIC, ANHYDROUS 15 MMOL: 142; 276 INJECTION, SOLUTION INTRAVENOUS at 08:04

## 2024-04-19 RX ADMIN — KETOROLAC TROMETHAMINE 15 MG: 30 INJECTION, SOLUTION INTRAMUSCULAR; INTRAVENOUS at 06:04

## 2024-04-19 RX ADMIN — DIATRIZOATE MEGLUMINE AND DIATRIZOATE SODIUM 120 ML: 600; 100 SOLUTION ORAL; RECTAL at 10:04

## 2024-04-19 RX ADMIN — DEXTROSE AND SODIUM CHLORIDE: 5; 900 INJECTION, SOLUTION INTRAVENOUS at 04:04

## 2024-04-19 RX ADMIN — DEXTROSE AND SODIUM CHLORIDE: 5; 900 INJECTION, SOLUTION INTRAVENOUS at 10:04

## 2024-04-19 RX ADMIN — DEXTROSE AND SODIUM CHLORIDE: 5; 900 INJECTION, SOLUTION INTRAVENOUS at 02:04

## 2024-04-19 RX ADMIN — KETOROLAC TROMETHAMINE 15 MG: 30 INJECTION, SOLUTION INTRAMUSCULAR at 03:04

## 2024-04-19 NOTE — NURSING
[facility] [place]  Nurses Note -- 4 Eyes    Pt received from ER, alert and oriented. On RA not in distress. With NGT noted on L nare on LIWS. Vital signs taken and recorded. On NPO for bowel rest. Pt informed and understand. With ongoing D5 0.9 NACL x 100, infusing well. Room oriented. Pts need attended. Bed in low position. Instructed to call for assistance. Call light within reach. Bed alarm on.  4/18/2024       Skin assessed on: Admit      [x] No Pressure Injuries Present    []Prevention Measures Documented    [] Yes LDA  for Pressure Injury Previously documented     [] Yes New Pressure Injury Discovered   [] LDA for New Pressure Injury Added      Attending RN:  Mercedes Teran RN     Second RN:  JESSICA Miller

## 2024-04-19 NOTE — SUBJECTIVE & OBJECTIVE
Interval History:  No acute overnight events.  Feeling better since NG tube placement.  No longer having any nausea.  Still has some abdominal pain, but it is improving.  Passing gas.    Review of Systems   Constitutional:  Negative for fever.   Gastrointestinal:  Positive for abdominal pain and nausea (improved). Negative for vomiting.   Genitourinary:  Negative for difficulty urinating and dysuria.     Objective:     Vital Signs (Most Recent):  Temp: 98.3 °F (36.8 °C) (04/19/24 1110)  Pulse: 69 (04/19/24 1110)  Resp: 18 (04/19/24 1110)  BP: 118/74 (04/19/24 1110)  SpO2: (!) 93 % (04/19/24 1110) Vital Signs (24h Range):  Temp:  [97.7 °F (36.5 °C)-99.8 °F (37.7 °C)] 98.3 °F (36.8 °C)  Pulse:  [69-83] 69  Resp:  [18-22] 18  SpO2:  [90 %-96 %] 93 %  BP: (116-142)/(70-85) 118/74     Weight: 118.1 kg (260 lb 4.8 oz)  Body mass index is 32.54 kg/m².    Intake/Output Summary (Last 24 hours) at 4/19/2024 1409  Last data filed at 4/19/2024 0719  Gross per 24 hour   Intake --   Output 1170 ml   Net -1170 ml         Physical Exam  Vitals and nursing note reviewed.   Constitutional:       General: He is not in acute distress.     Appearance: He is obese. He is not ill-appearing.   HENT:      Mouth/Throat:      Mouth: Mucous membranes are moist.   Eyes:      Extraocular Movements: Extraocular movements intact.   Cardiovascular:      Rate and Rhythm: Normal rate and regular rhythm.   Pulmonary:      Effort: Pulmonary effort is normal.      Breath sounds: Normal breath sounds.   Abdominal:      General: There is distension.      Palpations: Abdomen is soft.      Tenderness: There is abdominal tenderness (mild, diffuse. LLQ>RLQ).   Musculoskeletal:         General: No swelling or tenderness.   Skin:     General: Skin is warm and dry.   Neurological:      General: No focal deficit present.      Mental Status: He is alert and oriented to person, place, and time.   Psychiatric:         Mood and Affect: Mood normal.         Thought  Content: Thought content normal.             Significant Labs: All pertinent labs within the past 24 hours have been reviewed.    Significant Imaging: I have reviewed all pertinent imaging results/findings within the past 24 hours.

## 2024-04-19 NOTE — PLAN OF CARE
Case Management Assessment     PCP: urgent [facility]  Pharmacy: [facility] on [address]    Patient Arrived From: brother's home  Existing Help at Home: spouse,patsy anderson (Brother) [phone] (Mobile) and spouse Nicolasa     Barriers to Discharge: Medical care needed    Discharge Plan:    A. Home with spouse   B. Home with spouse      Independent lives with spouse Nicolasa in [place]. Here for business with spouse. To go back to [place] at discharge-Nicolasa will drive.  Goes to Urgent Care for medical needs.          04/19/24 5059   Discharge Assessment   Assessment Type Discharge Planning Assessment   Confirmed/corrected address, phone number and insurance Yes   Confirmed Demographics Correct on Facesheet   Source of Information patient   Reason For Admission ileus   People in Home spouse   Do you have help at home or someone to help you manage your care at home? Yes   Who are your caregiver(s) and their phone number(s)? wife Nicolasa [phone]   Prior to hospitilization cognitive status: Alert/Oriented   Current cognitive status: Alert/Oriented   Walking or Climbing Stairs Difficulty no   Dressing/Bathing Difficulty no   Equipment Currently Used at Home none   Readmission within 30 days? No   Patient currently being followed by outpatient case management? No   Do you currently have service(s) that help you manage your care at home? No   Do you take prescription medications? Yes   Do you have prescription coverage? Yes   Coverage [employer]   Do you have any problems affording any of your prescribed medications? No   Who is going to help you get home at discharge? patsy anderson (Brother)  [phone] (Mobile)   How do you get to doctors appointments? car, drives self;family or friend will provide   Are you on dialysis? No   Do you take coumadin? No  (plavix)   Discharge Plan A Home with family   Discharge Plan B Home with family   DME Needed Upon Discharge  none   Discharge Plan discussed  with: Patient   Transition of Care Barriers None   OTHER   Name(s) of People in Home monicapatsy (Brother)  [phone] (Mobile) and spouse Nicolasa

## 2024-04-19 NOTE — ASSESSMENT & PLAN NOTE
David Krishnan is a 63 y.o. male with history notable for prior Nissen Fundoplication (~ 15 years ago) who presented with acute onset abdominal pain and nausea with imaging concerning for obstruction vs ileus. He has improved with NG decompression and has some evidence of bowel function.     -GGC today  -Continue NPO, IVF

## 2024-04-19 NOTE — ASSESSMENT & PLAN NOTE
-Presented with abdominal pain and nausea. CT scan from [facility] with findings concerning for possible SBO. Last BM was loose on 04/18  -CT scan reviewed by surgery and symptoms suspected related to ileus  -NG tube to be placed, LIWS after confirmation  -NPO/IVF  -Daily CMP/Mg/Phos  -General surgery consulted: plan for Gastrografin challenge and 4/19  -Will treat conservatively with bowel rest, IV fluids, serial abdominal exams and avoidance of GI paralytics such as narcotics or anti-spasmodics. Monitor patient closely.

## 2024-04-19 NOTE — PLAN OF CARE
Problem: Adult Inpatient Plan of Care  Goal: Plan of Care Review  Outcome: Ongoing, Progressing  Flowsheets (Taken 4/19/2024 6747)  Plan of Care Reviewed With: patient  Goal: Patient-Specific Goal (Individualized)  Outcome: Ongoing, Progressing  Goal: Absence of Hospital-Acquired Illness or Injury  Outcome: Ongoing, Progressing  Goal: Optimal Comfort and Wellbeing  Outcome: Ongoing, Progressing  Goal: Readiness for Transition of Care  Outcome: Ongoing, Progressing     Problem: Skin Injury Risk Increased  Goal: Skin Health and Integrity  Outcome: Ongoing, Progressing

## 2024-04-19 NOTE — ASSESSMENT & PLAN NOTE
Chronic, controlled. Latest blood pressure and vitals reviewed-     Temp:  [97.7 °F (36.5 °C)-99.8 °F (37.7 °C)]   Pulse:  [69-83]   Resp:  [18-22]   BP: (116-142)/(70-85)   SpO2:  [90 %-96 %] .   Home meds for hypertension were reviewed and noted below.   Hypertension Medications               amLODIPine (NORVASC) 2.5 MG tablet Take 2.5 mg by mouth once daily.            While in the hospital, will manage blood pressure as follows; Adjust home antihypertensive regimen as follows- home amlodipine held while NPO for ileus as above    Will utilize p.r.n. blood pressure medication only if patient's blood pressure greater than 180/110 and he develops symptoms such as worsening chest pain or shortness of breath.

## 2024-04-19 NOTE — SUBJECTIVE & OBJECTIVE
Interval History: Mr. Krishnan states he overall feels better this morning. Passed a small amount of flatus overnight. NGT output decreasing.     Medications:  Continuous Infusions:  Current Facility-Administered Medications   Medication Dose Route Frequency Last Rate Last Admin    dextrose 5 % and 0.9 % NaCl   Intravenous Continuous 100 mL/hr at 04/19/24 0428 New Bag at 04/19/24 0428     Scheduled Meds:  Current Facility-Administered Medications   Medication Dose Route Frequency    sodium phosphate 15 mmol in dextrose 5 % (D5W) 250 mL IVPB  15 mmol Intravenous Once     PRN Meds:  Current Facility-Administered Medications:     acetaminophen, 650 mg, Per NG tube, Q6H PRN    glucagon (human recombinant), 1 mg, Intramuscular, PRN    glucose, 16 g, Oral, PRN    glucose, 24 g, Oral, PRN    naloxone, 0.02 mg, Intravenous, PRN    ondansetron, 4 mg, Oral, Q8H PRN    sodium chloride 0.9%, 10 mL, Intravenous, PRN     Review of patient's allergies indicates:  No Known Allergies  Objective:     Vital Signs (Most Recent):  Temp: 97.7 °F (36.5 °C) (04/19/24 0728)  Pulse: 69 (04/19/24 0728)  Resp: 19 (04/19/24 0728)  BP: 123/70 (04/19/24 0728)  SpO2: (!) 92 % (04/19/24 0728) Vital Signs (24h Range):  Temp:  [97.7 °F (36.5 °C)-99.8 °F (37.7 °C)] 97.7 °F (36.5 °C)  Pulse:  [69-83] 69  Resp:  [18-22] 19  SpO2:  [90 %-96 %] 92 %  BP: (116-142)/(70-85) 123/70     Weight: 118.1 kg (260 lb 4.8 oz)  Body mass index is 32.54 kg/m².    Intake/Output - Last 3 Shifts         04/17 0700 04/18 0659 04/18 0700 04/19 0659 04/19 0700 04/20 0659    Urine (mL/kg/hr)  200 300 (1)    Emesis/NG output  520     Drains  150     Stool  0     Total Output  870 300    Net  -870 -300           Urine Occurrence  1 x     Stool Occurrence  0 x              Physical Exam  Vitals reviewed.   Constitutional:       General: He is not in acute distress.  HENT:      Head: Normocephalic and atraumatic.   Eyes:      Extraocular Movements: Extraocular movements intact.    Cardiovascular:      Rate and Rhythm: Normal rate.   Pulmonary:      Effort: Pulmonary effort is normal. No respiratory distress.   Abdominal:      Palpations: Abdomen is soft.      Tenderness: There is no guarding or rebound.      Comments: Mild distention. Non-tender to palpation   Skin:     General: Skin is warm and dry.   Neurological:      Mental Status: He is alert.          Significant Labs:  I have reviewed all pertinent lab results within the past 24 hours.    Significant Diagnostics:  I have reviewed all pertinent imaging results/findings within the past 24 hours.

## 2024-04-19 NOTE — PROGRESS NOTES
[facility] Medicine  Progress Note    Patient Name: David Krishnan  MRN: [id number]  Patient Class: IP- Inpatient   Admission Date: 4/18/2024  Length of Stay: 1 days  Attending Physician: Ludmila Huerta DO  Primary Care Provider: Sandy, Primary Doctor        Subjective:     Principal Problem:Ileus        HPI:  David Krishnan 63 y.o. male with CAD, HTN, history of Nissen fundoplication presents to the hosptial with a chief complaint of abdominal pain.  He reports 24 hours of progressive abdominal pain with associated nausea.  Reports he has had bowel movements since the symptoms began in his bowel movement was this morning.  He initially was seen and at [facility] and underwent a CT scan of the was directed to the emergency room given abnormal findings.  He finds his nausea is improved with nasogastric tube placement.  Denies any passage of flatus today.  He denies fever chest pain shortness breast leg swelling hematuria hematemesis dizziness or syncope.  His Nissen fundoplication was 15 years ago.    In the ED, afebrile without leukocytosis seen by General surgery who reviewed outside CT scan suspect his symptoms related to ileus.    Overview/Hospital Course:  63 y.o. male with CAD, HTN, history of Nissen fundoplication transferred from outside hospital for further evaluation of ileus versus small-bowel obstruction.  Presented with 24 hours of progressive pain associated with nausea but no vomiting.CT scan from outside hospital with findings concerning for possible SBO.  NGT placed with LIWS. General surgery consulted. NPO and IVF. Surgery plan for Gastrografin challenge and 4/19    Interval History:  No acute overnight events.  Feeling better since NG tube placement.  No longer having any nausea.  Still has some abdominal pain, but it is improving.  Passing gas.    Review of Systems   Constitutional:  Negative for fever.   Gastrointestinal:  Positive for abdominal pain and nausea  (improved). Negative for vomiting.   Genitourinary:  Negative for difficulty urinating and dysuria.     Objective:     Vital Signs (Most Recent):  Temp: 98.3 °F (36.8 °C) (04/19/24 1110)  Pulse: 69 (04/19/24 1110)  Resp: 18 (04/19/24 1110)  BP: 118/74 (04/19/24 1110)  SpO2: (!) 93 % (04/19/24 1110) Vital Signs (24h Range):  Temp:  [97.7 °F (36.5 °C)-99.8 °F (37.7 °C)] 98.3 °F (36.8 °C)  Pulse:  [69-83] 69  Resp:  [18-22] 18  SpO2:  [90 %-96 %] 93 %  BP: (116-142)/(70-85) 118/74     Weight: 118.1 kg (260 lb 4.8 oz)  Body mass index is 32.54 kg/m².    Intake/Output Summary (Last 24 hours) at 4/19/2024 1409  Last data filed at 4/19/2024 0719  Gross per 24 hour   Intake --   Output 1170 ml   Net -1170 ml         Physical Exam  Vitals and nursing note reviewed.   Constitutional:       General: He is not in acute distress.     Appearance: He is obese. He is not ill-appearing.   HENT:      Mouth/Throat:      Mouth: Mucous membranes are moist.   Eyes:      Extraocular Movements: Extraocular movements intact.   Cardiovascular:      Rate and Rhythm: Normal rate and regular rhythm.   Pulmonary:      Effort: Pulmonary effort is normal.      Breath sounds: Normal breath sounds.   Abdominal:      General: There is distension.      Palpations: Abdomen is soft.      Tenderness: There is abdominal tenderness (mild, diffuse. LLQ>RLQ).   Musculoskeletal:         General: No swelling or tenderness.   Skin:     General: Skin is warm and dry.   Neurological:      General: No focal deficit present.      Mental Status: He is alert and oriented to person, place, and time.   Psychiatric:         Mood and Affect: Mood normal.         Thought Content: Thought content normal.             Significant Labs: All pertinent labs within the past 24 hours have been reviewed.    Significant Imaging: I have reviewed all pertinent imaging results/findings within the past 24 hours.    Assessment/Plan:      * Ileus  -Presented with abdominal pain and  nausea. CT scan from [facility] with findings concerning for possible SBO. Last BM was loose on 04/18  -CT scan reviewed by surgery and symptoms suspected related to ileus  -NG tube to be placed, LIWS after confirmation  -NPO/IVF  -Daily CMP/Mg/Phos  -General surgery consulted: plan for Gastrografin challenge and 4/19  -Will treat conservatively with bowel rest, IV fluids, serial abdominal exams and avoidance of GI paralytics such as narcotics or anti-spasmodics. Monitor patient closely.       HTN (hypertension)  Chronic, controlled. Latest blood pressure and vitals reviewed-     Temp:  [97.7 °F (36.5 °C)-99.8 °F (37.7 °C)]   Pulse:  [69-83]   Resp:  [18-22]   BP: (116-142)/(70-85)   SpO2:  [90 %-96 %] .   Home meds for hypertension were reviewed and noted below.   Hypertension Medications               amLODIPine (NORVASC) 2.5 MG tablet Take 2.5 mg by mouth once daily.            While in the hospital, will manage blood pressure as follows; Adjust home antihypertensive regimen as follows- home amlodipine held while NPO for ileus as above    Will utilize p.r.n. blood pressure medication only if patient's blood pressure greater than 180/110 and he develops symptoms such as worsening chest pain or shortness of breath.    Coronary artery disease  Followed in [place] for history of CAD.   Home plavix held while NPO with NG tube in place given ileus.     Hypophosphatemia  Patient has Abnormal Phosphorus: hypophosphatemia. Will continue to monitor electrolytes closely. Will replace the affected electrolytes and repeat labs to be done after interventions completed. The patient's phosphorus results have been reviewed and are listed below.  Recent Labs   Lab 04/19/24  0404   PHOS 2.5*        -phos 2.5 on 04/19  -replace as needed    Class 1 obesity due to excess calories with serious comorbidity and body mass index (BMI) of 32.0 to 32.9 in adult  Body mass index is 32.54 kg/m². Morbid obesity complicates all aspects of disease  management from diagnostic modalities to treatment. Weight loss encouraged and health benefits explained to patient.           VTE Risk Mitigation (From admission, onward)           Ordered     IP VTE HIGH RISK PATIENT  Once         04/18/24 1739     Place sequential compression device  Until discontinued         04/18/24 1739     Place CARMELO hose  Until discontinued         04/18/24 1739                    Discharge Planning   AL:      Code Status: Full Code   Is the patient medically ready for discharge?:     Reason for patient still in hospital (select all that apply): Patient trending condition, Treatment, and Consult recommendations                     Ludmila Huerta DO  Department of Hospital Medicine   [facility] [place] [facility] Surg

## 2024-04-19 NOTE — HOSPITAL COURSE
63 y.o. male with CAD, HTN, history of Nissen fundoplication transferred from outside hospital for further evaluation of ileus versus small-bowel obstruction.  Presented with 24 hours of progressive pain associated with nausea but no vomiting.CT scan from outside hospital with findings concerning for possible SBO.  NGT placed with LIWS. General surgery consulted. NPO and IVF. Patient improved with conservative management. S/p Gastrografin challenge on 4/19, normal transit time.  Diet was advanced, and patient tolerated regular diet without any abdominal pain, nausea, or vomiting.  Stable for discharge from surgical standpoint.    Patient admits feeling significantly better, especially after the NG tube was removed.  Has had a bowel movement during hospitalization.  Tolerated regular diet without difficulty. Pt denies any fever, chest pain, shortness of breath, palpitations, abdominal pain, nausea, vomiting, or any new weaknesses. Feels ready to go home. Patient's exam on discharge was as follow: Patient is alert and oriented, appears in no acute distress, heart with regular rate and rhythm, lungs clear to asculation with non-labored breathing, abdomen soft and nontender, and no new weaknesses or focal deficits seen. Bilateral lower extremities without any edema or calf tenderness.     Patient was counseled regarding any abnormal labs, differential diagnosis, treatment options, risk-benefit, lifestyle changes, prognosis, current condition, and medications. Patient was interactive and attentive.  Patient's questions were answered in a respectful and timely manner. Patient was instructed to follow-up with PCP within 1 week and to continue taking medications as prescribed. Also, extensively discussed the risks, benefits, and side effects of patient's medications. Discussed with patient about any medication changes. Patient verbalized understanding and agrees to treatment plan.  Patient is stable for discharge.  Patient  has no other questions or concerns at this time.  ED precautions discussed with the patient.    Vital signs are stable. Ambulating without any difficulty. Tolerating p.o. intake without any nausea or vomiting. Afebrile for over 24 hours. Patient is in stable condition and has no questions or concerns. Patient will be discharge to home once transportation secured .  CM/SW to assist with discharge planning.     Vitals:    04/19/24 2359 04/20/24 0427 04/20/24 0706 04/20/24 1157   BP: 121/67 118/69 116/77 134/83   BP Location: Right arm Right arm Right arm Right arm   Patient Position: Lying Lying Lying Lying   Pulse: 73 71 65 64   Resp: 19 18 16 16   Temp: 98.1 °F (36.7 °C) 98.3 °F (36.8 °C) 97.9 °F (36.6 °C) 98.2 °F (36.8 °C)   TempSrc: Oral Oral Oral Oral   SpO2: (!) 92% 95% (!) 94% 95%   Weight:       Height:

## 2024-04-19 NOTE — ASSESSMENT & PLAN NOTE
Patient has Abnormal Phosphorus: hypophosphatemia. Will continue to monitor electrolytes closely. Will replace the affected electrolytes and repeat labs to be done after interventions completed. The patient's phosphorus results have been reviewed and are listed below.  Recent Labs   Lab 04/19/24  0407   PHOS 2.5*        -phos 2.5 on 04/19  -replace as needed

## 2024-04-19 NOTE — ASSESSMENT & PLAN NOTE
Body mass index is 32.54 kg/m². Morbid obesity complicates all aspects of disease management from diagnostic modalities to treatment. Weight loss encouraged and health benefits explained to patient.

## 2024-04-19 NOTE — ASSESSMENT & PLAN NOTE
Followed in [place] for history of CAD.   Home plavix held while NPO with NG tube in place given ileus.

## 2024-04-19 NOTE — PLAN OF CARE
Problem: Adult Inpatient Plan of Care  Goal: Plan of Care Review  Outcome: Ongoing, Progressing  Flowsheets (Taken 4/19/2024 0623)  Plan of Care Reviewed With: patient  Goal: Optimal Comfort and Wellbeing  Outcome: Ongoing, Progressing  Intervention: Monitor Pain and Promote Comfort  Flowsheets (Taken 4/19/2024 0623)  Pain Management Interventions:   pillow support provided   quiet environment facilitated   position adjusted

## 2024-04-19 NOTE — PROGRESS NOTES
[facility] Surg  General Surgery  Progress Note    Subjective:     History of Present Illness:  No notes on file    Post-Op Info:  * No surgery found *         Interval History: Mr. Krishnan states he overall feels better this morning. Passed a small amount of flatus overnight. NGT output decreasing.     Medications:  Continuous Infusions:  Current Facility-Administered Medications   Medication Dose Route Frequency Last Rate Last Admin    dextrose 5 % and 0.9 % NaCl   Intravenous Continuous 100 mL/hr at 04/19/24 0428 New Bag at 04/19/24 0428     Scheduled Meds:  Current Facility-Administered Medications   Medication Dose Route Frequency    sodium phosphate 15 mmol in dextrose 5 % (D5W) 250 mL IVPB  15 mmol Intravenous Once     PRN Meds:  Current Facility-Administered Medications:     acetaminophen, 650 mg, Per NG tube, Q6H PRN    glucagon (human recombinant), 1 mg, Intramuscular, PRN    glucose, 16 g, Oral, PRN    glucose, 24 g, Oral, PRN    naloxone, 0.02 mg, Intravenous, PRN    ondansetron, 4 mg, Oral, Q8H PRN    sodium chloride 0.9%, 10 mL, Intravenous, PRN     Review of patient's allergies indicates:  No Known Allergies  Objective:     Vital Signs (Most Recent):  Temp: 97.7 °F (36.5 °C) (04/19/24 0728)  Pulse: 69 (04/19/24 0728)  Resp: 19 (04/19/24 0728)  BP: 123/70 (04/19/24 0728)  SpO2: (!) 92 % (04/19/24 0728) Vital Signs (24h Range):  Temp:  [97.7 °F (36.5 °C)-99.8 °F (37.7 °C)] 97.7 °F (36.5 °C)  Pulse:  [69-83] 69  Resp:  [18-22] 19  SpO2:  [90 %-96 %] 92 %  BP: (116-142)/(70-85) 123/70     Weight: 118.1 kg (260 lb 4.8 oz)  Body mass index is 32.54 kg/m².    Intake/Output - Last 3 Shifts         04/17 0700  04/18 0659 04/18 0700 04/19 0659 04/19 0700 04/20 0659    Urine (mL/kg/hr)  200 300 (1)    Emesis/NG output  520     Drains  150     Stool  0     Total Output  870 300    Net  -870 -300           Urine Occurrence  1 x     Stool Occurrence  0 x              Physical Exam  Vitals reviewed.    Constitutional:       General: He is not in acute distress.  HENT:      Head: Normocephalic and atraumatic.   Eyes:      Extraocular Movements: Extraocular movements intact.   Cardiovascular:      Rate and Rhythm: Normal rate.   Pulmonary:      Effort: Pulmonary effort is normal. No respiratory distress.   Abdominal:      Palpations: Abdomen is soft.      Tenderness: There is no guarding or rebound.      Comments: Mild distention. Non-tender to palpation   Skin:     General: Skin is warm and dry.   Neurological:      Mental Status: He is alert.          Significant Labs:  I have reviewed all pertinent lab results within the past 24 hours.    Significant Diagnostics:  I have reviewed all pertinent imaging results/findings within the past 24 hours.  Assessment/Plan:     * Ileus David Krishnan is a 63 y.o. male with history notable for prior Nissen Fundoplication (~ 15 years ago) who presented with acute onset abdominal pain and nausea with imaging concerning for obstruction vs ileus. He has improved with NG decompression and has some evidence of bowel function.     -GGC today  -Continue NPO, IVF            Vin Elizalde MD  General Surgery  [facility] - Med Surg

## 2024-04-19 NOTE — NURSING
Patient remained free of falls during shift. POC reviewed throughout shift.Mediations given as ordered. NGT d/c per 's order. Nurse MDs notified patient report BM.Patient now on CLD. AAOX4, RR even and unlabored on room air. PIVs in place, sites are clean dry and intact. No acute distress noted. Will report to night nurse.

## 2024-04-19 NOTE — NURSING
Nurse notified , , and  patient is requesting for NGT to be removed now. Patient c/o left facial pain and blurred vision.    came to bedside to assess. Patient re-educated on POC and gastrografin challenge.  15 mg Toradol given IV as ordered.  Will reassess throughout end of shift.

## 2024-04-19 NOTE — PLAN OF CARE
Case Management Assessment     PCP: Urgent Care  Pharmacy: [facility] on [address]    Patient Arrived From: brother's home - here on business  Existing Help at Home: patsy anderson (Brother) [phone] (Mobile) and spouse Nicolasa     Barriers to Discharge: none    Discharge Plan:    A. Home with spouse   B. Home with spouse       LIves           04/19/24 4999   Discharge Assessment   Assessment Type Discharge Planning Assessment   Confirmed/corrected address, phone number and insurance Yes   Confirmed Demographics Correct on Facesheet   Source of Information patient   Reason For Admission ileus   People in Home spouse   Do you have help at home or someone to help you manage your care at home? Yes   Who are your caregiver(s) and their phone number(s)? wife Nicolasa [phone]   Prior to hospitilization cognitive status: Alert/Oriented   Current cognitive status: Alert/Oriented   Walking or Climbing Stairs Difficulty no   Dressing/Bathing Difficulty no   Equipment Currently Used at Home none   Readmission within 30 days? No   Patient currently being followed by outpatient case management? No   Do you currently have service(s) that help you manage your care at home? No   Do you take prescription medications? Yes   Do you have prescription coverage? Yes   Coverage [employer]   Do you have any problems affording any of your prescribed medications? No   Who is going to help you get home at discharge? patsy anderson (Brother)  [phone] (Mobile)   How do you get to doctors appointments? car, drives self;family or friend will provide   Are you on dialysis? No   Do you take coumadin? No  (plavix)   Discharge Plan A Home with family   Discharge Plan B Home with family   DME Needed Upon Discharge  none   Discharge Plan discussed with: Patient   Transition of Care Barriers None   OTHER   Name(s) of People in Home patsy anderson (Brother)  [phone] (Mobile) and spouse Nicolasa

## 2024-04-20 VITALS
HEART RATE: 64 BPM | HEIGHT: 75 IN | SYSTOLIC BLOOD PRESSURE: 134 MMHG | RESPIRATION RATE: 16 BRPM | TEMPERATURE: 98 F | OXYGEN SATURATION: 95 % | WEIGHT: 260.31 LBS | BODY MASS INDEX: 32.36 KG/M2 | DIASTOLIC BLOOD PRESSURE: 83 MMHG

## 2024-04-20 LAB
ANION GAP SERPL CALC-SCNC: 2 MMOL/L (ref 8–16)
BUN SERPL-MCNC: 12 MG/DL (ref 8–23)
CALCIUM SERPL-MCNC: 8.1 MG/DL (ref 8.7–10.5)
CHLORIDE SERPL-SCNC: 112 MMOL/L (ref 95–110)
CHOLEST SERPL-MCNC: 92 MG/DL (ref 120–199)
CHOLEST/HDLC SERPL: 2.4 {RATIO} (ref 2–5)
CO2 SERPL-SCNC: 26 MMOL/L (ref 23–29)
CREAT SERPL-MCNC: 0.8 MG/DL (ref 0.5–1.4)
EST. GFR  (NO RACE VARIABLE): >60 ML/MIN/1.73 M^2
GLUCOSE SERPL-MCNC: 110 MG/DL (ref 70–110)
HDLC SERPL-MCNC: 39 MG/DL (ref 40–75)
HDLC SERPL: 42.4 % (ref 20–50)
LDLC SERPL CALC-MCNC: 36.4 MG/DL (ref 63–159)
NONHDLC SERPL-MCNC: 53 MG/DL
PHOSPHATE SERPL-MCNC: 2.1 MG/DL (ref 2.7–4.5)
POTASSIUM SERPL-SCNC: 3.4 MMOL/L (ref 3.5–5.1)
SODIUM SERPL-SCNC: 140 MMOL/L (ref 136–145)
TRIGL SERPL-MCNC: 83 MG/DL (ref 30–150)

## 2024-04-20 PROCEDURE — 84100 ASSAY OF PHOSPHORUS: CPT | Performed by: STUDENT IN AN ORGANIZED HEALTH CARE EDUCATION/TRAINING PROGRAM

## 2024-04-20 PROCEDURE — 80048 BASIC METABOLIC PNL TOTAL CA: CPT | Performed by: STUDENT IN AN ORGANIZED HEALTH CARE EDUCATION/TRAINING PROGRAM

## 2024-04-20 PROCEDURE — 36415 COLL VENOUS BLD VENIPUNCTURE: CPT | Performed by: STUDENT IN AN ORGANIZED HEALTH CARE EDUCATION/TRAINING PROGRAM

## 2024-04-20 PROCEDURE — 80061 LIPID PANEL: CPT | Performed by: STUDENT IN AN ORGANIZED HEALTH CARE EDUCATION/TRAINING PROGRAM

## 2024-04-20 NOTE — SUBJECTIVE & OBJECTIVE
Interval History: Mr. Krishnan passed his GGC with SARMAD. He feels better and is tolerating clears, but hungry for additional PO.    Medications:  Continuous Infusions:  Current Facility-Administered Medications   Medication Dose Route Frequency Last Rate Last Admin    dextrose 5 % and 0.9 % NaCl   Intravenous Continuous 100 mL/hr at 04/19/24 2211 New Bag at 04/19/24 2211     Scheduled Meds:  Current Facility-Administered Medications   Medication Dose Route Frequency     PRN Meds:  Current Facility-Administered Medications:     acetaminophen, 650 mg, Per NG tube, Q6H PRN    glucagon (human recombinant), 1 mg, Intramuscular, PRN    glucose, 16 g, Oral, PRN    glucose, 24 g, Oral, PRN    naloxone, 0.02 mg, Intravenous, PRN    ondansetron, 4 mg, Oral, Q8H PRN    sodium chloride 0.9%, 10 mL, Intravenous, PRN     Review of patient's allergies indicates:  No Known Allergies  Objective:     Vital Signs (Most Recent):  Temp: 97.9 °F (36.6 °C) (04/20/24 0706)  Pulse: 65 (04/20/24 0706)  Resp: 16 (04/20/24 0706)  BP: 116/77 (04/20/24 0706)  SpO2: (!) 94 % (04/20/24 0706) Vital Signs (24h Range):  Temp:  [97.9 °F (36.6 °C)-98.7 °F (37.1 °C)] 97.9 °F (36.6 °C)  Pulse:  [65-73] 65  Resp:  [16-19] 16  SpO2:  [90 %-95 %] 94 %  BP: (116-132)/(64-80) 116/77     Weight: 118.1 kg (260 lb 4.8 oz)  Body mass index is 32.54 kg/m².    Intake/Output - Last 3 Shifts         04/18 0700 04/19 0659 04/19 0700 04/20 0659 04/20 0700 04/21 0659    P.O.   0    I.V. (mL/kg)  3415.3 (28.9)     Total Intake(mL/kg)  3415.3 (28.9) 0 (0)    Urine (mL/kg/hr) 200 300 (0.1)     Emesis/NG output 520      Drains 150 200     Stool 0      Total Output 870 500     Net -870 +2915.3 0           Urine Occurrence 1 x 5 x     Stool Occurrence 0 x 2 x              Physical Exam  Vitals reviewed.   Constitutional:       General: He is not in acute distress.  HENT:      Head: Normocephalic and atraumatic.   Eyes:      Extraocular Movements: Extraocular movements intact.    Cardiovascular:      Rate and Rhythm: Normal rate.   Pulmonary:      Effort: Pulmonary effort is normal. No respiratory distress.   Abdominal:      General: There is no distension.      Palpations: Abdomen is soft.      Tenderness: There is no abdominal tenderness. There is no guarding or rebound.   Skin:     General: Skin is warm and dry.   Neurological:      Mental Status: He is alert.          Significant Labs:  I have reviewed all pertinent lab results within the past 24 hours.    Significant Diagnostics:  I have reviewed all pertinent imaging results/findings within the past 24 hours.

## 2024-04-20 NOTE — NURSING
[facility]  Nurses Note -- 4 Eyes      4/20/2024       Skin assessed on: Q Shift      [x] No Pressure Injuries Present    []Prevention Measures Documented    [] Yes LDA  for Pressure Injury Previously documented     [] Yes New Pressure Injury Discovered   [] LDA for New Pressure Injury Added      Attending RN:  Ana Albarado RN     Second RN:  Mercedes Teran

## 2024-04-20 NOTE — PROGRESS NOTES
[facility] Surg  General Surgery  Progress Note    Subjective:     History of Present Illness:  No notes on file    Post-Op Info:  * No surgery found *         Interval History: Mr. Krishnan passed his GGC with LESLYF. He feels better and is tolerating clears, but hungry for additional PO.    Medications:  Continuous Infusions:  Current Facility-Administered Medications   Medication Dose Route Frequency Last Rate Last Admin    dextrose 5 % and 0.9 % NaCl   Intravenous Continuous 100 mL/hr at 04/19/24 2211 New Bag at 04/19/24 2211     Scheduled Meds:  Current Facility-Administered Medications   Medication Dose Route Frequency     PRN Meds:  Current Facility-Administered Medications:     acetaminophen, 650 mg, Per NG tube, Q6H PRN    glucagon (human recombinant), 1 mg, Intramuscular, PRN    glucose, 16 g, Oral, PRN    glucose, 24 g, Oral, PRN    naloxone, 0.02 mg, Intravenous, PRN    ondansetron, 4 mg, Oral, Q8H PRN    sodium chloride 0.9%, 10 mL, Intravenous, PRN     Review of patient's allergies indicates:  No Known Allergies  Objective:     Vital Signs (Most Recent):  Temp: 97.9 °F (36.6 °C) (04/20/24 0706)  Pulse: 65 (04/20/24 0706)  Resp: 16 (04/20/24 0706)  BP: 116/77 (04/20/24 0706)  SpO2: (!) 94 % (04/20/24 0706) Vital Signs (24h Range):  Temp:  [97.9 °F (36.6 °C)-98.7 °F (37.1 °C)] 97.9 °F (36.6 °C)  Pulse:  [65-73] 65  Resp:  [16-19] 16  SpO2:  [90 %-95 %] 94 %  BP: (116-132)/(64-80) 116/77     Weight: 118.1 kg (260 lb 4.8 oz)  Body mass index is 32.54 kg/m².    Intake/Output - Last 3 Shifts         04/18 0700 04/19 0659 04/19 0700 04/20 0659 04/20 0700 04/21 0659    P.O.   0    I.V. (mL/kg)  3415.3 (28.9)     Total Intake(mL/kg)  3415.3 (28.9) 0 (0)    Urine (mL/kg/hr) 200 300 (0.1)     Emesis/NG output 520      Drains 150 200     Stool 0      Total Output 870 500     Net -870 +2915.3 0           Urine Occurrence 1 x 5 x     Stool Occurrence 0 x 2 x              Physical Exam  Vitals reviewed.    Constitutional:       General: He is not in acute distress.  HENT:      Head: Normocephalic and atraumatic.   Eyes:      Extraocular Movements: Extraocular movements intact.   Cardiovascular:      Rate and Rhythm: Normal rate.   Pulmonary:      Effort: Pulmonary effort is normal. No respiratory distress.   Abdominal:      General: There is no distension.      Palpations: Abdomen is soft.      Tenderness: There is no abdominal tenderness. There is no guarding or rebound.   Skin:     General: Skin is warm and dry.   Neurological:      Mental Status: He is alert.          Significant Labs:  I have reviewed all pertinent lab results within the past 24 hours.    Significant Diagnostics:  I have reviewed all pertinent imaging results/findings within the past 24 hours.  Assessment/Plan:     * Ileus  David Krishnan is a 63 y.o. male with history notable for prior Nissen Fundoplication (~ 15 years ago) who presented with acute onset abdominal pain and nausea with imaging concerning for obstruction vs ileus. He has improved with conservative management and passed a GGC with ROBF.    -ADAT  -Stable to discharge from surgical perspective pending tolerance of regular diet.             Vin Elizalde MD  General Surgery  [facility] - Med Surg

## 2024-04-20 NOTE — NURSING
[facility]  Nurses Note -- 4 Eyes      4/19/2024       Skin assessed on: Q Shift      [x] No Pressure Injuries Present    [x]Prevention Measures Documented    [] Yes LDA  for Pressure Injury Previously documented     [] Yes New Pressure Injury Discovered   [] LDA for New Pressure Injury Added      Attending RN:  Mercedes Teran RN     Second RN:  JESSICA Lynn

## 2024-04-20 NOTE — PLAN OF CARE
Problem: Adult Inpatient Plan of Care  Goal: Plan of Care Review  Outcome: Ongoing, Progressing  Flowsheets (Taken 4/20/2024 0608)  Plan of Care Reviewed With: patient  Goal: Optimal Comfort and Wellbeing  Outcome: Ongoing, Progressing  Intervention: Monitor Pain and Promote Comfort  Flowsheets (Taken 4/20/2024 0608)  Pain Management Interventions:   pillow support provided   quiet environment facilitated   position adjusted

## 2024-04-20 NOTE — ASSESSMENT & PLAN NOTE
David Krishnan is a 63 y.o. male with history notable for prior Nissen Fundoplication (~ 15 years ago) who presented with acute onset abdominal pain and nausea with imaging concerning for obstruction vs ileus. He has improved with conservative management and passed a GGC with ROBF.    -ADAT  -Stable to discharge from surgical perspective pending tolerance of regular diet.

## 2024-04-20 NOTE — PLAN OF CARE
04/20/24 1019   Final Note   Assessment Type Final Discharge Note   Anticipated Discharge Disposition Home  (Follow-ups)   What phone number can be called within the next 1-3 days to see how you are doing after discharge?   ([phone])   Hospital Resources/Appts/Education Provided Provided patient/caregiver with written discharge plan information;Provided education on problems/symptoms using teachback;Appointments scheduled and added to AVS;Post-Acute resouces added to AVS   Post-Acute Status   Post-Acute Authorization Other  (Home; follow-up)   Coverage [employer]   Other Status No Post-Acute Service Needs   Discharge Delays None known at this time

## 2024-04-20 NOTE — DISCHARGE SUMMARY
[facility]  Discharge Summary      Patient Name: David Krishnan  MRN: [id number]  TONA: [id number]  Patient Class: IP- Inpatient  Admission Date: 4/18/2024  Hospital Length of Stay: 2 days  Discharge Date and Time: 4/20/2024  2:36 PM  Attending Physician: Sandy att. providers found   Discharging Provider: Ludmila Huerta DO  Primary Care Provider: Sandy, Primary Doctor    Primary Care Team: Networked reference to record PCT     HPI:   David Krishnan 63 y.o. male with CAD, HTN, history of Nissen fundoplication presents to the hosptial with a chief complaint of abdominal pain.  He reports 24 hours of progressive abdominal pain with associated nausea.  Reports he has had bowel movements since the symptoms began in his bowel movement was this morning.  He initially was seen and at [facility] and underwent a CT scan of the was directed to the emergency room given abnormal findings.  He finds his nausea is improved with nasogastric tube placement.  Denies any passage of flatus today.  He denies fever chest pain shortness breast leg swelling hematuria hematemesis dizziness or syncope.  His Nissen fundoplication was 15 years ago.    In the ED, afebrile without leukocytosis seen by General surgery who reviewed outside CT scan suspect his symptoms related to ileus.    * No surgery found *      Hospital Course:   63 y.o. male with CAD, HTN, history of Nissen fundoplication transferred from outside hospital for further evaluation of ileus versus small-bowel obstruction.  Presented with 24 hours of progressive pain associated with nausea but no vomiting.CT scan from outside hospital with findings concerning for possible SBO.  NGT placed with LIWS. General surgery consulted. NPO and IVF. Patient improved with conservative management. S/p Gastrografin challenge on 4/19, normal transit time.  Diet was advanced, and patient tolerated regular diet without any abdominal pain, nausea, or vomiting.   Stable for discharge from surgical standpoint.    Patient admits feeling significantly better, especially after the NG tube was removed.  Has had a bowel movement during hospitalization.  Tolerated regular diet without difficulty. Pt denies any fever, chest pain, shortness of breath, palpitations, abdominal pain, nausea, vomiting, or any new weaknesses. Feels ready to go home. Patient's exam on discharge was as follow: Patient is alert and oriented, appears in no acute distress, heart with regular rate and rhythm, lungs clear to asculation with non-labored breathing, abdomen soft and nontender, and no new weaknesses or focal deficits seen. Bilateral lower extremities without any edema or calf tenderness.     Patient was counseled regarding any abnormal labs, differential diagnosis, treatment options, risk-benefit, lifestyle changes, prognosis, current condition, and medications. Patient was interactive and attentive.  Patient's questions were answered in a respectful and timely manner. Patient was instructed to follow-up with PCP within 1 week and to continue taking medications as prescribed. Also, extensively discussed the risks, benefits, and side effects of patient's medications. Discussed with patient about any medication changes. Patient verbalized understanding and agrees to treatment plan.  Patient is stable for discharge.  Patient has no other questions or concerns at this time.  ED precautions discussed with the patient.    Vital signs are stable. Ambulating without any difficulty. Tolerating p.o. intake without any nausea or vomiting. Afebrile for over 24 hours. Patient is in stable condition and has no questions or concerns. Patient will be discharge to home once transportation secured .  CM/SW to assist with discharge planning.     Vitals:    04/19/24 2359 04/20/24 0427 04/20/24 0706 04/20/24 1157   BP: 121/67 118/69 116/77 134/83   BP Location: Right arm Right arm Right arm Right arm   Patient Position:  Lying Lying Lying Lying   Pulse: 73 71 65 64   Resp: 19 18 16 16   Temp: 98.1 °F (36.7 °C) 98.3 °F (36.8 °C) 97.9 °F (36.6 °C) 98.2 °F (36.8 °C)   TempSrc: Oral Oral Oral Oral   SpO2: (!) 92% 95% (!) 94% 95%   Weight:       Height:                Goals of Care Treatment Preferences:  Code Status: Full Code      Consults:   Consults (From admission, onward)          Status Ordering Provider     Case Management/  Once        Provider:  (Not yet assigned)    SHA Maier new Assessment & Plan notes have been filed under this hospital service since the last note was generated.  Service: Hospital Medicine    Final Active Diagnoses:    Diagnosis Date Noted POA    PRINCIPAL PROBLEM:  Ileus [K56.7] 04/18/2024 Yes    HTN (hypertension) [I10] 04/18/2024 Yes    Coronary artery disease [I25.10] 04/18/2024 Yes    Hypophosphatemia [E83.39] 04/19/2024 Yes    Class 1 obesity due to excess calories with serious comorbidity and body mass index (BMI) of 32.0 to 32.9 in adult [E66.09, Z68.32] 04/19/2024 Not Applicable      Problems Resolved During this Admission:       Discharged Condition: stable    Disposition: Home or Self Care    Follow Up:   Follow-up Information       [facility]. Schedule an appointment as soon as possible for a visit in 1 week(s).    Why: [facility] Follow-Up needed in 1 week post-discharge  Contact information:  [id number] [place]  [address] [phone]                         Patient Instructions:      Diet Cardiac     Notify your health care provider if you experience any of the following:  temperature >100.4     Notify your health care provider if you experience any of the following:  persistent nausea and vomiting or diarrhea     Notify your health care provider if you experience any of the following:  increased confusion or weakness     Notify your health care provider if you experience any of the following:  severe uncontrolled  pain     Activity as tolerated       Significant Diagnostic Studies: Labs: All labs within the past 24 hours have been reviewed      Recent Results (from the past 100 hour(s))   ISTAT PROCEDURE    Collection Time: 04/18/24  2:42 PM   Result Value Ref Range    POC Glucose 121 (H) 70 - 110 mg/dL    POC BUN 15 6 - 30 mg/dL    POC Creatinine 0.9 0.5 - 1.4 mg/dL    POC Sodium 139 136 - 145 mmol/L    POC Potassium 4.0 3.5 - 5.1 mmol/L    POC Chloride 103 95 - 110 mmol/L    POC TCO2 (MEASURED) 25 23 - 29 mmol/L    POC Anion Gap 16 8 - 16 mmol/L    POC Ionized Calcium 1.21 1.06 - 1.42 mmol/L    POC Hematocrit 39 36 - 54 %PCV    Sample VENOUS     Site Other     Allens Test N/A    CBC W/ AUTO DIFFERENTIAL    Collection Time: 04/18/24  2:43 PM   Result Value Ref Range    WBC 6.59 3.90 - 12.70 K/uL    RBC 4.35 (L) 4.60 - 6.20 M/uL    Hemoglobin 13.2 (L) 14.0 - 18.0 g/dL    Hematocrit 39.1 (L) 40.0 - 54.0 %    MCV 90 82 - 98 fL    MCH 30.3 27.0 - 31.0 pg    MCHC 33.8 32.0 - 36.0 g/dL    RDW 12.7 11.5 - 14.5 %    Platelets 196 150 - 450 K/uL    MPV 9.3 9.2 - 12.9 fL    Immature Granulocytes 0.6 (H) 0.0 - 0.5 %    Gran # (ANC) 5.5 1.8 - 7.7 K/uL    Immature Grans (Abs) 0.04 0.00 - 0.04 K/uL    Lymph # 0.5 (L) 1.0 - 4.8 K/uL    Mono # 0.6 0.3 - 1.0 K/uL    Eos # 0.0 0.0 - 0.5 K/uL    Baso # 0.01 0.00 - 0.20 K/uL    nRBC 0 0 /100 WBC    Gran % 83.2 (H) 38.0 - 73.0 %    Lymph % 7.1 (L) 18.0 - 48.0 %    Mono % 8.6 4.0 - 15.0 %    Eosinophil % 0.3 0.0 - 8.0 %    Basophil % 0.2 0.0 - 1.9 %    Differential Method Automated    Comp. Metabolic Panel    Collection Time: 04/18/24  2:43 PM   Result Value Ref Range    Sodium 138 136 - 145 mmol/L    Potassium 4.1 3.5 - 5.1 mmol/L    Chloride 107 95 - 110 mmol/L    CO2 26 23 - 29 mmol/L    Glucose 124 (H) 70 - 110 mg/dL    BUN 16 8 - 23 mg/dL    Creatinine 0.9 0.5 - 1.4 mg/dL    Calcium 8.7 8.7 - 10.5 mg/dL    Total Protein 5.8 (L) 6.0 - 8.4 g/dL    Albumin 3.1 (L) 3.5 - 5.2 g/dL    Total  Bilirubin 0.4 0.1 - 1.0 mg/dL    Alkaline Phosphatase 61 55 - 135 U/L    AST 26 10 - 40 U/L    ALT 32 10 - 44 U/L    eGFR >60 >60 mL/min/1.73 m^2    Anion Gap 5 (L) 8 - 16 mmol/L   Lipase    Collection Time: 04/18/24  2:43 PM   Result Value Ref Range    Lipase 23 4 - 60 U/L   Urinalysis, Reflex to Urine Culture Urine, Clean Catch    Collection Time: 04/19/24 12:15 AM    Specimen: Urine   Result Value Ref Range    Specimen UA Urine, Clean Catch     Color, UA Yellow Yellow, Straw, Maria Elena    Appearance, UA Clear Clear    pH, UA 6.0 5.0 - 8.0    Specific Gravity, UA 1.030 1.005 - 1.030    Protein, UA Trace (A) Negative    Glucose, UA Negative Negative    Ketones, UA Trace (A) Negative    Bilirubin (UA) Negative Negative    Occult Blood UA Negative Negative    Nitrite, UA Negative Negative    Urobilinogen, UA Negative <2.0 EU/dL    Leukocytes, UA Negative Negative   CBC Auto Differential    Collection Time: 04/19/24  4:07 AM   Result Value Ref Range    WBC 3.79 (L) 3.90 - 12.70 K/uL    RBC 4.09 (L) 4.60 - 6.20 M/uL    Hemoglobin 12.1 (L) 14.0 - 18.0 g/dL    Hematocrit 38.1 (L) 40.0 - 54.0 %    MCV 93 82 - 98 fL    MCH 29.6 27.0 - 31.0 pg    MCHC 31.8 (L) 32.0 - 36.0 g/dL    RDW 12.9 11.5 - 14.5 %    Platelets 217 150 - 450 K/uL    MPV 9.6 9.2 - 12.9 fL    Immature Granulocytes 0.3 0.0 - 0.5 %    Gran # (ANC) 2.2 1.8 - 7.7 K/uL    Immature Grans (Abs) 0.01 0.00 - 0.04 K/uL    Lymph # 0.9 (L) 1.0 - 4.8 K/uL    Mono # 0.6 0.3 - 1.0 K/uL    Eos # 0.1 0.0 - 0.5 K/uL    Baso # 0.01 0.00 - 0.20 K/uL    nRBC 0 0 /100 WBC    Gran % 58.2 38.0 - 73.0 %    Lymph % 22.4 18.0 - 48.0 %    Mono % 16.4 (H) 4.0 - 15.0 %    Eosinophil % 2.4 0.0 - 8.0 %    Basophil % 0.3 0.0 - 1.9 %    Differential Method Automated    Comprehensive Metabolic Panel    Collection Time: 04/19/24  4:07 AM   Result Value Ref Range    Sodium 139 136 - 145 mmol/L    Potassium 3.7 3.5 - 5.1 mmol/L    Chloride 108 95 - 110 mmol/L    CO2 24 23 - 29 mmol/L    Glucose  117 (H) 70 - 110 mg/dL    BUN 17 8 - 23 mg/dL    Creatinine 0.9 0.5 - 1.4 mg/dL    Calcium 8.0 (L) 8.7 - 10.5 mg/dL    Total Protein 5.3 (L) 6.0 - 8.4 g/dL    Albumin 2.7 (L) 3.5 - 5.2 g/dL    Total Bilirubin 0.4 0.1 - 1.0 mg/dL    Alkaline Phosphatase 51 (L) 55 - 135 U/L    AST 25 10 - 40 U/L    ALT 31 10 - 44 U/L    eGFR >60 >60 mL/min/1.73 m^2    Anion Gap 7 (L) 8 - 16 mmol/L   Magnesium    Collection Time: 04/19/24  4:07 AM   Result Value Ref Range    Magnesium 2.0 1.6 - 2.6 mg/dL   Phosphorus    Collection Time: 04/19/24  4:07 AM   Result Value Ref Range    Phosphorus 2.5 (L) 2.7 - 4.5 mg/dL   Basic Metabolic Panel    Collection Time: 04/20/24  7:16 AM   Result Value Ref Range    Sodium 140 136 - 145 mmol/L    Potassium 3.4 (L) 3.5 - 5.1 mmol/L    Chloride 112 (H) 95 - 110 mmol/L    CO2 26 23 - 29 mmol/L    Glucose 110 70 - 110 mg/dL    BUN 12 8 - 23 mg/dL    Creatinine 0.8 0.5 - 1.4 mg/dL    Calcium 8.1 (L) 8.7 - 10.5 mg/dL    Anion Gap 2 (L) 8 - 16 mmol/L    eGFR >60 >60 mL/min/1.73 m^2   Phosphorus    Collection Time: 04/20/24  7:16 AM   Result Value Ref Range    Phosphorus 2.1 (L) 2.7 - 4.5 mg/dL   Lipid Panel    Collection Time: 04/20/24  7:16 AM   Result Value Ref Range    Cholesterol 92 (L) 120 - 199 mg/dL    Triglycerides 83 30 - 150 mg/dL    HDL 39 (L) 40 - 75 mg/dL    LDL Cholesterol 36.4 (L) 63.0 - 159.0 mg/dL    HDL/Cholesterol Ratio 42.4 20.0 - 50.0 %    Total Cholesterol/HDL Ratio 2.4 2.0 - 5.0    Non-HDL Cholesterol 53 mg/dL       Microbiology Results (last 7 days)       ** No results found for the last 168 hours. **            Imaging Results              XR Gastric tube check, non-radiologist performed (Final result)  Result time 04/18/24 17:58:02   Procedure changed from X-Ray Abdomen AP 1 View (KUB)     Final result by Pascale Mcnair MD (04/18/24 17:58:02)                   Impression:      NG tube in place.      Electronically signed by: Pascale Mcnair,  MD  Date:    04/18/2024  Time:    17:58               Narrative:    EXAMINATION:  XR GASTRIC TUBE CHECK, NON-RADIOLOGIST PERFORMED    CLINICAL HISTORY:  NG Placement;  Encounter for other specified special examinations    TECHNIQUE:  Single AP view of the upper abdomen.    COMPARISON:  None    FINDINGS:  Enteric tube extends well below the diaphragm curled retrograde in the gastric fundal region.  There is gaseous distention of small bowel loops in the mid upper abdomen which could indicate ileus or developing obstruction.                                          Pending Diagnostic Studies:       None           Medications:  Reconciled Home Medications:      Medication List        CONTINUE taking these medications      amLODIPine 2.5 MG tablet  Commonly known as: NORVASC  Take 2.5 mg by mouth once daily.     clopidogreL 75 mg tablet  Commonly known as: PLAVIX  Take 75 mg by mouth once daily.              Indwelling Lines/Drains at time of discharge:   Lines/Drains/Airways       None                   Time spent on the discharge of patient: Greater than 35 minutes         Ludmila Huerta DO  Department of Hospital Medicine  [facility]

## 2024-04-20 NOTE — NURSING
Pt discharged per MD order. IV removed. Catheter tip intact. No distress noted. Discharge instructions to be reviewed with pt by dc nurse. Given the opportunity for questions. All questions answered. AVS given to pt and placed in blue folder. Patient verbalized understanding of all instructions. Vitals per chart. afebrile. No complaints of pain, N/V, diarrhea, or SOB.

## 2024-04-20 NOTE — PLAN OF CARE
Patient is ready and clear for discharge from Case Management's perspective; informed patient's nurse, Selam. Discussed and provided patient with written discharge instruction and education. Resource in AVS for follow-up

## 2024-04-20 NOTE — PLAN OF CARE
Case Management Final Discharge Note    Discharge Disposition: Home; follow-up  New DME ordered/Company name: None  Relevant SDOH/Transition of Care Barriers: None  Primary Caretaker and contact information: Chente: [phone]  Scheduled followup appointment: Into AVS  Referrals placed: None  Transportation: Patient, family    Patient and family educated on discharge services and updated on DC plan. Bedside RN notified, patient clear to discharge from Case Management Perspective.

## 2024-04-20 NOTE — PLAN OF CARE
04/20/24 1018   Post-Acute Status   Post-Acute Authorization Other  (Home with follow-up)   Coverage [employer]   Other Status No Post-Acute Service Needs   Hospital Resources/Appts/Education Provided Provided patient/caregiver with written discharge plan information;Provided education on problems/symptoms using teachback;Appointments scheduled and added to AVS;Post-Acute resouces added to AVS   Discharge Delays None known at this time   Discharge Plan   Discharge Plan A Home with family  (Follow-ups)   Discharge Plan B Home with family  (Follow-up)

## 2024-04-20 NOTE — NURSING
Pt states he would like to shower and he will notify me when he is ready for his wheelchair transport.